# Patient Record
Sex: FEMALE | Race: BLACK OR AFRICAN AMERICAN | Employment: OTHER
[De-identification: names, ages, dates, MRNs, and addresses within clinical notes are randomized per-mention and may not be internally consistent; named-entity substitution may affect disease eponyms.]

---

## 2017-01-25 DIAGNOSIS — F31.9 BIPOLAR 1 DISORDER (HCC): ICD-10-CM

## 2017-01-25 DIAGNOSIS — R73.9 HYPERGLYCEMIA: ICD-10-CM

## 2017-01-25 DIAGNOSIS — Z13.9 SCREENING: ICD-10-CM

## 2017-01-25 DIAGNOSIS — E78.5 HYPERLIPIDEMIA, UNSPECIFIED HYPERLIPIDEMIA TYPE: ICD-10-CM

## 2017-01-25 DIAGNOSIS — F41.9 ANXIETY: ICD-10-CM

## 2017-02-21 ENCOUNTER — OFFICE VISIT (OUTPATIENT)
Dept: FAMILY MEDICINE CLINIC | Facility: CLINIC | Age: 67
End: 2017-02-21

## 2017-02-21 VITALS
WEIGHT: 188 LBS | HEART RATE: 82 BPM | DIASTOLIC BLOOD PRESSURE: 67 MMHG | SYSTOLIC BLOOD PRESSURE: 132 MMHG | HEIGHT: 65 IN | BODY MASS INDEX: 31.32 KG/M2 | OXYGEN SATURATION: 98 % | TEMPERATURE: 98 F

## 2017-02-21 DIAGNOSIS — M85.80 OTHER SPECIFIED DISORDERS OF BONE DENSITY AND STRUCTURE, UNSPECIFIED SITE: ICD-10-CM

## 2017-02-21 DIAGNOSIS — F48.9 NONPSYCHOTIC MENTAL DISORDER: ICD-10-CM

## 2017-02-21 DIAGNOSIS — E78.5 HYPERLIPIDEMIA, UNSPECIFIED HYPERLIPIDEMIA TYPE: Primary | ICD-10-CM

## 2017-02-21 DIAGNOSIS — Z13.9 SCREENING: ICD-10-CM

## 2017-02-21 DIAGNOSIS — F31.9 BIPOLAR 1 DISORDER (HCC): ICD-10-CM

## 2017-02-21 DIAGNOSIS — R73.9 HYPERGLYCEMIA: ICD-10-CM

## 2017-02-21 DIAGNOSIS — K59.00 CONSTIPATION, UNSPECIFIED CONSTIPATION TYPE: ICD-10-CM

## 2017-02-21 DIAGNOSIS — F41.9 ANXIETY: ICD-10-CM

## 2017-02-21 PROCEDURE — 3014F SCREEN MAMMO DOC REV: CPT | Performed by: PHYSICIAN ASSISTANT

## 2017-02-21 PROCEDURE — 3017F COLORECTAL CA SCREEN DOC REV: CPT | Performed by: PHYSICIAN ASSISTANT

## 2017-02-21 PROCEDURE — G8427 DOCREV CUR MEDS BY ELIG CLIN: HCPCS | Performed by: PHYSICIAN ASSISTANT

## 2017-02-21 PROCEDURE — 1123F ACP DISCUSS/DSCN MKR DOCD: CPT | Performed by: PHYSICIAN ASSISTANT

## 2017-02-21 PROCEDURE — G8484 FLU IMMUNIZE NO ADMIN: HCPCS | Performed by: PHYSICIAN ASSISTANT

## 2017-02-21 PROCEDURE — G8417 CALC BMI ABV UP PARAM F/U: HCPCS | Performed by: PHYSICIAN ASSISTANT

## 2017-02-21 PROCEDURE — 4040F PNEUMOC VAC/ADMIN/RCVD: CPT | Performed by: PHYSICIAN ASSISTANT

## 2017-02-21 PROCEDURE — G8399 PT W/DXA RESULTS DOCUMENT: HCPCS | Performed by: PHYSICIAN ASSISTANT

## 2017-02-21 PROCEDURE — 1036F TOBACCO NON-USER: CPT | Performed by: PHYSICIAN ASSISTANT

## 2017-02-21 PROCEDURE — 99214 OFFICE O/P EST MOD 30 MIN: CPT | Performed by: PHYSICIAN ASSISTANT

## 2017-02-21 PROCEDURE — 1090F PRES/ABSN URINE INCON ASSESS: CPT | Performed by: PHYSICIAN ASSISTANT

## 2017-02-21 ASSESSMENT — ENCOUNTER SYMPTOMS
COUGH: 0
SINUS PRESSURE: 0
DIARRHEA: 0
EYE DISCHARGE: 0
EYE ITCHING: 0
CHEST TIGHTNESS: 0
CONSTIPATION: 1
RHINORRHEA: 0
NAUSEA: 0
ORTHOPNEA: 0
ABDOMINAL PAIN: 0
SHORTNESS OF BREATH: 0
BLURRED VISION: 0
VOMITING: 0
SORE THROAT: 0

## 2017-02-21 ASSESSMENT — PATIENT HEALTH QUESTIONNAIRE - PHQ9
2. FEELING DOWN, DEPRESSED OR HOPELESS: 0
SUM OF ALL RESPONSES TO PHQ9 QUESTIONS 1 & 2: 0
1. LITTLE INTEREST OR PLEASURE IN DOING THINGS: 0
SUM OF ALL RESPONSES TO PHQ QUESTIONS 1-9: 0

## 2017-07-25 ENCOUNTER — HOSPITAL ENCOUNTER (EMERGENCY)
Facility: CLINIC | Age: 67
Discharge: HOME | End: 2017-07-25
Attending: EMERGENCY MEDICINE
Payer: MEDICARE

## 2017-07-25 VITALS
HEART RATE: 75 BPM | RESPIRATION RATE: 19 BRPM | OXYGEN SATURATION: 96 % | DIASTOLIC BLOOD PRESSURE: 75 MMHG | SYSTOLIC BLOOD PRESSURE: 158 MMHG | TEMPERATURE: 97.7 F | WEIGHT: 185 LBS | BODY MASS INDEX: 30.82 KG/M2 | HEIGHT: 65 IN

## 2017-07-25 DIAGNOSIS — N39.0 URINARY TRACT INFECTION, SITE UNSPECIFIED: Primary | ICD-10-CM

## 2017-07-25 LAB
-: ABNORMAL
ABSOLUTE EOS #: 0.2 K/UL (ref 0–0.4)
ABSOLUTE LYMPH #: 1.6 K/UL (ref 1–4.8)
ABSOLUTE MONO #: 1.2 K/UL (ref 0.1–1.2)
AMORPHOUS: ABNORMAL
ANION GAP SERPL CALCULATED.3IONS-SCNC: 11 MMOL/L (ref 9–17)
BACTERIA: ABNORMAL
BASOPHILS # BLD: 0 %
BASOPHILS ABSOLUTE: 0 K/UL (ref 0–0.2)
BILIRUBIN URINE: NEGATIVE
BUN BLDV-MCNC: 19 MG/DL (ref 8–23)
BUN/CREAT BLD: ABNORMAL (ref 9–20)
CALCIUM SERPL-MCNC: 9.5 MG/DL (ref 8.6–10.4)
CASTS UA: ABNORMAL /LPF (ref 0–2)
CHLORIDE BLD-SCNC: 106 MMOL/L (ref 98–107)
CO2: 28 MMOL/L (ref 20–31)
COLOR: YELLOW
COMMENT UA: ABNORMAL
CREAT SERPL-MCNC: 0.7 MG/DL (ref 0.5–0.9)
CRYSTALS, UA: ABNORMAL /HPF
CULTURE: NORMAL
DIFFERENTIAL TYPE: NORMAL
EOSINOPHILS RELATIVE PERCENT: 2 %
EPITHELIAL CELLS UA: ABNORMAL /HPF (ref 0–5)
GFR AFRICAN AMERICAN: >60 ML/MIN
GFR NON-AFRICAN AMERICAN: >60 ML/MIN
GFR SERPL CREATININE-BSD FRML MDRD: ABNORMAL ML/MIN/{1.73_M2}
GFR SERPL CREATININE-BSD FRML MDRD: ABNORMAL ML/MIN/{1.73_M2}
GLUCOSE BLD-MCNC: 120 MG/DL (ref 70–99)
GLUCOSE URINE: NEGATIVE
HCT VFR BLD CALC: 39.2 % (ref 36–46)
HEMOGLOBIN: 12.8 G/DL (ref 12–16)
KETONES, URINE: NEGATIVE
LEUKOCYTE ESTERASE, URINE: ABNORMAL
LYMPHOCYTES # BLD: 23 %
Lab: NORMAL
MCH RBC QN AUTO: 30.3 PG (ref 26–34)
MCHC RBC AUTO-ENTMCNC: 32.6 G/DL (ref 31–37)
MCV RBC AUTO: 92.7 FL (ref 80–100)
MONOCYTES # BLD: 17 %
MUCUS: ABNORMAL
NITRITE, URINE: POSITIVE
OTHER OBSERVATIONS UA: ABNORMAL
PDW BLD-RTO: 13 % (ref 12.5–15.4)
PH UA: 5.5 (ref 5–8)
PLATELET # BLD: 381 K/UL (ref 140–450)
PLATELET ESTIMATE: NORMAL
PMV BLD AUTO: 7.5 FL (ref 6–12)
POTASSIUM SERPL-SCNC: 3.8 MMOL/L (ref 3.7–5.3)
PROTEIN UA: ABNORMAL
RBC # BLD: 4.22 M/UL (ref 4–5.2)
RBC # BLD: NORMAL 10*6/UL
RBC UA: ABNORMAL /HPF (ref 0–2)
RENAL EPITHELIAL, UA: ABNORMAL /HPF
SEG NEUTROPHILS: 58 %
SEGMENTED NEUTROPHILS ABSOLUTE COUNT: 4.1 K/UL (ref 1.8–7.7)
SODIUM BLD-SCNC: 145 MMOL/L (ref 135–144)
SPECIFIC GRAVITY UA: 1.01 (ref 1–1.03)
SPECIMEN DESCRIPTION: NORMAL
SPECIMEN DESCRIPTION: NORMAL
STATUS: NORMAL
TRICHOMONAS: ABNORMAL
TURBIDITY: ABNORMAL
URINE HGB: ABNORMAL
UROBILINOGEN, URINE: NORMAL
WBC # BLD: 7.1 K/UL (ref 3.5–11)
WBC # BLD: NORMAL 10*3/UL
WBC UA: ABNORMAL /HPF (ref 0–5)
YEAST: ABNORMAL

## 2017-07-25 PROCEDURE — 99284 EMERGENCY DEPT VISIT MOD MDM: CPT

## 2017-07-25 PROCEDURE — 87186 SC STD MICRODIL/AGAR DIL: CPT

## 2017-07-25 PROCEDURE — 87077 CULTURE AEROBIC IDENTIFY: CPT

## 2017-07-25 PROCEDURE — 87086 URINE CULTURE/COLONY COUNT: CPT

## 2017-07-25 PROCEDURE — 36415 COLL VENOUS BLD VENIPUNCTURE: CPT

## 2017-07-25 PROCEDURE — 80048 BASIC METABOLIC PNL TOTAL CA: CPT

## 2017-07-25 PROCEDURE — 81001 URINALYSIS AUTO W/SCOPE: CPT

## 2017-07-25 PROCEDURE — 85025 COMPLETE CBC W/AUTO DIFF WBC: CPT

## 2017-07-25 RX ORDER — DIPHENHYDRAMINE HCL 25 MG
25 CAPSULE ORAL EVERY 6 HOURS PRN
COMMUNITY

## 2017-07-25 RX ORDER — CEPHALEXIN 500 MG/1
500 CAPSULE ORAL 4 TIMES DAILY
Qty: 28 CAPSULE | Refills: 0 | Status: SHIPPED | OUTPATIENT
Start: 2017-07-25 | End: 2017-07-25

## 2017-07-25 RX ORDER — IBUPROFEN 200 MG
200 TABLET ORAL EVERY 6 HOURS PRN
COMMUNITY

## 2017-07-25 RX ORDER — CEFDINIR 250 MG/5ML
250 POWDER, FOR SUSPENSION ORAL 2 TIMES DAILY
Qty: 70 ML | Refills: 0 | Status: SHIPPED | OUTPATIENT
Start: 2017-07-25 | End: 2017-08-01

## 2017-07-25 RX ORDER — TRAZODONE HYDROCHLORIDE 100 MG/1
100 TABLET ORAL NIGHTLY
COMMUNITY

## 2017-07-25 RX ORDER — NICOTINE 14MG/24HR
PATCH, TRANSDERMAL 24 HOURS TRANSDERMAL
COMMUNITY

## 2017-07-25 RX ORDER — GUAIFENESIN 600 MG/1
600 TABLET, EXTENDED RELEASE ORAL 2 TIMES DAILY PRN
COMMUNITY

## 2017-07-27 LAB
CULTURE: ABNORMAL
CULTURE: ABNORMAL
Lab: ABNORMAL
ORGANISM: ABNORMAL
SPECIMEN DESCRIPTION: ABNORMAL
STATUS: ABNORMAL

## 2017-08-08 ENCOUNTER — OFFICE VISIT (OUTPATIENT)
Dept: FAMILY MEDICINE CLINIC | Age: 67
End: 2017-08-08
Payer: MEDICARE

## 2017-08-08 VITALS
TEMPERATURE: 97.6 F | BODY MASS INDEX: 31.32 KG/M2 | DIASTOLIC BLOOD PRESSURE: 74 MMHG | WEIGHT: 188 LBS | SYSTOLIC BLOOD PRESSURE: 123 MMHG | HEART RATE: 70 BPM | HEIGHT: 65 IN

## 2017-08-08 DIAGNOSIS — R53.83 FATIGUE, UNSPECIFIED TYPE: ICD-10-CM

## 2017-08-08 DIAGNOSIS — N39.0 URINARY TRACT INFECTION WITHOUT HEMATURIA, SITE UNSPECIFIED: Primary | ICD-10-CM

## 2017-08-08 PROCEDURE — G8427 DOCREV CUR MEDS BY ELIG CLIN: HCPCS | Performed by: PHYSICIAN ASSISTANT

## 2017-08-08 PROCEDURE — 3017F COLORECTAL CA SCREEN DOC REV: CPT | Performed by: PHYSICIAN ASSISTANT

## 2017-08-08 PROCEDURE — 1123F ACP DISCUSS/DSCN MKR DOCD: CPT | Performed by: PHYSICIAN ASSISTANT

## 2017-08-08 PROCEDURE — 1036F TOBACCO NON-USER: CPT | Performed by: PHYSICIAN ASSISTANT

## 2017-08-08 PROCEDURE — 3014F SCREEN MAMMO DOC REV: CPT | Performed by: PHYSICIAN ASSISTANT

## 2017-08-08 PROCEDURE — 4040F PNEUMOC VAC/ADMIN/RCVD: CPT | Performed by: PHYSICIAN ASSISTANT

## 2017-08-08 PROCEDURE — 99213 OFFICE O/P EST LOW 20 MIN: CPT | Performed by: PHYSICIAN ASSISTANT

## 2017-08-08 PROCEDURE — G8399 PT W/DXA RESULTS DOCUMENT: HCPCS | Performed by: PHYSICIAN ASSISTANT

## 2017-08-08 PROCEDURE — G8417 CALC BMI ABV UP PARAM F/U: HCPCS | Performed by: PHYSICIAN ASSISTANT

## 2017-08-08 PROCEDURE — 1090F PRES/ABSN URINE INCON ASSESS: CPT | Performed by: PHYSICIAN ASSISTANT

## 2017-08-08 RX ORDER — GUAIFENESIN AND DEXTROMETHORPHAN HYDROBROMIDE 100; 10 MG/5ML; MG/5ML
5 SOLUTION ORAL EVERY 4 HOURS PRN
COMMUNITY

## 2017-08-08 RX ORDER — SODIUM PHOSPHATE, DIBASIC AND SODIUM PHOSPHATE, MONOBASIC 7; 19 G/133ML; G/133ML
1 ENEMA RECTAL
COMMUNITY
End: 2020-07-21

## 2017-08-08 ASSESSMENT — ENCOUNTER SYMPTOMS
COUGH: 0
BLURRED VISION: 0
NAUSEA: 0
ABDOMINAL PAIN: 0
DIARRHEA: 0
EYE DISCHARGE: 0
SINUS PRESSURE: 0
RHINORRHEA: 0
CHEST TIGHTNESS: 0
CONSTIPATION: 1
SHORTNESS OF BREATH: 0
SORE THROAT: 0
VOMITING: 0
EYE ITCHING: 0
ORTHOPNEA: 0

## 2017-08-09 ENCOUNTER — TELEPHONE (OUTPATIENT)
Dept: FAMILY MEDICINE CLINIC | Age: 67
End: 2017-08-09

## 2017-08-09 ENCOUNTER — HOSPITAL ENCOUNTER (OUTPATIENT)
Facility: CLINIC | Age: 67
Discharge: HOME | End: 2017-08-09
Payer: MEDICARE

## 2017-08-09 DIAGNOSIS — K59.00 CONSTIPATION, UNSPECIFIED CONSTIPATION TYPE: ICD-10-CM

## 2017-08-09 DIAGNOSIS — E78.5 HYPERLIPIDEMIA, UNSPECIFIED HYPERLIPIDEMIA TYPE: ICD-10-CM

## 2017-08-09 DIAGNOSIS — Z13.9 SCREENING FOR CONDITION: ICD-10-CM

## 2017-08-09 DIAGNOSIS — M85.80 OTHER SPECIFIED DISORDERS OF BONE DENSITY AND STRUCTURE, UNSPECIFIED SITE: ICD-10-CM

## 2017-08-09 DIAGNOSIS — F48.9 NONPSYCHOTIC MENTAL DISORDER: ICD-10-CM

## 2017-08-09 DIAGNOSIS — F41.9 ANXIETY: ICD-10-CM

## 2017-08-09 DIAGNOSIS — F31.9 BIPOLAR 1 DISORDER (HCC): ICD-10-CM

## 2017-08-09 DIAGNOSIS — R53.83 FATIGUE, UNSPECIFIED TYPE: ICD-10-CM

## 2017-08-09 DIAGNOSIS — N39.0 URINARY TRACT INFECTION WITHOUT HEMATURIA, SITE UNSPECIFIED: ICD-10-CM

## 2017-08-09 DIAGNOSIS — R73.9 HYPERGLYCEMIA: ICD-10-CM

## 2017-08-09 LAB
-: NORMAL
ABSOLUTE EOS #: 0.8 K/UL (ref 0–0.4)
ABSOLUTE LYMPH #: 2.2 K/UL (ref 1–4.8)
ABSOLUTE MONO #: 0.8 K/UL (ref 0.1–1.2)
BASOPHILS # BLD: 0 %
BASOPHILS ABSOLUTE: 0 K/UL (ref 0–0.2)
CHOLESTEROL/HDL RATIO: 5.3
CHOLESTEROL: 147 MG/DL
DIFFERENTIAL TYPE: ABNORMAL
EOSINOPHILS RELATIVE PERCENT: 8 %
ESTIMATED AVERAGE GLUCOSE: 114 MG/DL
HBA1C MFR BLD: 5.6 % (ref 4–6)
HCT VFR BLD CALC: 40.4 % (ref 36–46)
HDLC SERPL-MCNC: 28 MG/DL
HEMOGLOBIN: 13.1 G/DL (ref 12–16)
INSULIN COMMENT: NORMAL
INSULIN REFERENCE RANGE:: NORMAL
INSULIN: 7.7 MU/L
LDL CHOLESTEROL: 90 MG/DL (ref 0–130)
LYMPHOCYTES # BLD: 22 %
MCH RBC QN AUTO: 29.3 PG (ref 26–34)
MCHC RBC AUTO-ENTMCNC: 32.4 G/DL (ref 31–37)
MCV RBC AUTO: 90.4 FL (ref 80–100)
MONOCYTES # BLD: 8 %
PDW BLD-RTO: 14 % (ref 12.5–15.4)
PLATELET # BLD: 435 K/UL (ref 140–450)
PLATELET ESTIMATE: ABNORMAL
PMV BLD AUTO: 7.7 FL (ref 6–12)
RBC # BLD: 4.47 M/UL (ref 4–5.2)
RBC # BLD: ABNORMAL 10*6/UL
REASON FOR REJECTION: NORMAL
SEG NEUTROPHILS: 62 %
SEGMENTED NEUTROPHILS ABSOLUTE COUNT: 6.4 K/UL (ref 1.8–7.7)
TRIGL SERPL-MCNC: 143 MG/DL
TSH SERPL DL<=0.05 MIU/L-ACNC: 3.02 MIU/L (ref 0.3–5)
VALPROIC ACID, FREE: 8.5 UG/ML (ref 4–15)
VITAMIN D 25-HYDROXY: 41 NG/ML (ref 30–100)
VLDLC SERPL CALC-MCNC: ABNORMAL MG/DL (ref 1–30)
WBC # BLD: 10.3 K/UL (ref 3.5–11)
WBC # BLD: ABNORMAL 10*3/UL
ZZ NTE CLEAN UP: ORDERED TEST: NORMAL
ZZ NTE WITH NAME CLEAN UP: SPECIMEN SOURCE: NORMAL

## 2017-08-09 PROCEDURE — 85025 COMPLETE CBC W/AUTO DIFF WBC: CPT

## 2017-08-09 PROCEDURE — 80061 LIPID PANEL: CPT

## 2017-08-09 PROCEDURE — 82306 VITAMIN D 25 HYDROXY: CPT

## 2017-08-09 PROCEDURE — 84443 ASSAY THYROID STIM HORMONE: CPT

## 2017-08-09 PROCEDURE — 83525 ASSAY OF INSULIN: CPT

## 2017-08-09 PROCEDURE — 87040 BLOOD CULTURE FOR BACTERIA: CPT

## 2017-08-09 PROCEDURE — 83036 HEMOGLOBIN GLYCOSYLATED A1C: CPT

## 2017-08-09 PROCEDURE — 80165 DIPROPYLACETIC ACID FREE: CPT

## 2017-08-09 PROCEDURE — 36415 COLL VENOUS BLD VENIPUNCTURE: CPT

## 2017-08-10 ENCOUNTER — HOSPITAL ENCOUNTER (OUTPATIENT)
Facility: CLINIC | Age: 67
Discharge: HOME | End: 2017-08-10
Payer: MEDICARE

## 2017-08-10 LAB
-: ABNORMAL
ALBUMIN SERPL-MCNC: 2.9 G/DL (ref 3.5–5.2)
ALBUMIN/GLOBULIN RATIO: 0.6 (ref 1–2.5)
ALP BLD-CCNC: 68 U/L (ref 35–104)
ALT SERPL-CCNC: 14 U/L (ref 5–33)
AMORPHOUS: ABNORMAL
ANION GAP SERPL CALCULATED.3IONS-SCNC: 16 MMOL/L (ref 9–17)
AST SERPL-CCNC: 44 U/L
BACTERIA: ABNORMAL
BILIRUB SERPL-MCNC: 0.2 MG/DL (ref 0.3–1.2)
BILIRUBIN URINE: NEGATIVE
BUN BLDV-MCNC: 10 MG/DL (ref 8–23)
BUN/CREAT BLD: ABNORMAL (ref 9–20)
CALCIUM SERPL-MCNC: 8.8 MG/DL (ref 8.6–10.4)
CASTS UA: ABNORMAL /LPF (ref 0–8)
CHLORIDE BLD-SCNC: 98 MMOL/L (ref 98–107)
CO2: 22 MMOL/L (ref 20–31)
COLOR: YELLOW
COMMENT UA: ABNORMAL
CREAT SERPL-MCNC: 0.6 MG/DL (ref 0.5–0.9)
CRYSTALS, UA: ABNORMAL /HPF
EPITHELIAL CELLS UA: ABNORMAL /HPF (ref 0–5)
GFR AFRICAN AMERICAN: >60 ML/MIN
GFR NON-AFRICAN AMERICAN: >60 ML/MIN
GFR SERPL CREATININE-BSD FRML MDRD: ABNORMAL ML/MIN/{1.73_M2}
GFR SERPL CREATININE-BSD FRML MDRD: ABNORMAL ML/MIN/{1.73_M2}
GLUCOSE BLD-MCNC: 111 MG/DL (ref 70–99)
GLUCOSE URINE: NEGATIVE
KETONES, URINE: NEGATIVE
LEUKOCYTE ESTERASE, URINE: ABNORMAL
MUCUS: ABNORMAL
NITRITE, URINE: POSITIVE
OTHER OBSERVATIONS UA: ABNORMAL
PH UA: 5.5 (ref 5–8)
POTASSIUM SERPL-SCNC: 5.1 MMOL/L (ref 3.7–5.3)
PROTEIN UA: ABNORMAL
RBC UA: ABNORMAL /HPF (ref 0–4)
RENAL EPITHELIAL, UA: ABNORMAL /HPF
SODIUM BLD-SCNC: 136 MMOL/L (ref 135–144)
SPECIFIC GRAVITY UA: 1.02 (ref 1–1.03)
TOTAL PROTEIN: 7.4 G/DL (ref 6.4–8.3)
TRICHOMONAS: ABNORMAL
TURBIDITY: ABNORMAL
URINE HGB: ABNORMAL
UROBILINOGEN, URINE: NORMAL
WBC UA: ABNORMAL /HPF (ref 0–5)
YEAST: ABNORMAL

## 2017-08-10 PROCEDURE — 87088 URINE BACTERIA CULTURE: CPT

## 2017-08-10 PROCEDURE — 80053 COMPREHEN METABOLIC PANEL: CPT

## 2017-08-10 PROCEDURE — 87086 URINE CULTURE/COLONY COUNT: CPT

## 2017-08-10 PROCEDURE — 81001 URINALYSIS AUTO W/SCOPE: CPT

## 2017-08-10 PROCEDURE — 87186 SC STD MICRODIL/AGAR DIL: CPT

## 2017-08-11 RX ORDER — SULFAMETHOXAZOLE AND TRIMETHOPRIM 800; 160 MG/1; MG/1
1 TABLET ORAL 2 TIMES DAILY
Qty: 20 TABLET | Refills: 0 | Status: SHIPPED | OUTPATIENT
Start: 2017-08-11 | End: 2017-08-21

## 2017-08-15 ENCOUNTER — HOSPITAL ENCOUNTER (EMERGENCY)
Facility: CLINIC | Age: 67
Discharge: HOME | End: 2017-08-15
Attending: EMERGENCY MEDICINE
Payer: MEDICARE

## 2017-08-15 ENCOUNTER — TELEPHONE (OUTPATIENT)
Dept: FAMILY MEDICINE CLINIC | Age: 67
End: 2017-08-15

## 2017-08-15 VITALS
OXYGEN SATURATION: 95 % | HEART RATE: 74 BPM | SYSTOLIC BLOOD PRESSURE: 149 MMHG | WEIGHT: 185 LBS | HEIGHT: 65 IN | DIASTOLIC BLOOD PRESSURE: 133 MMHG | RESPIRATION RATE: 14 BRPM | TEMPERATURE: 97.5 F | BODY MASS INDEX: 30.82 KG/M2

## 2017-08-15 DIAGNOSIS — L27.0 DERMATITIS DUE TO DRUG: Primary | ICD-10-CM

## 2017-08-15 LAB
CULTURE: NORMAL
CULTURE: NORMAL
Lab: NORMAL
SPECIMEN DESCRIPTION: NORMAL
SPECIMEN DESCRIPTION: NORMAL
STATUS: NORMAL

## 2017-08-15 PROCEDURE — 99282 EMERGENCY DEPT VISIT SF MDM: CPT

## 2017-08-15 ASSESSMENT — ENCOUNTER SYMPTOMS
SHORTNESS OF BREATH: 0
CONSTIPATION: 0
VOMITING: 0
COUGH: 0
DIARRHEA: 0
EYE PAIN: 0
BACK PAIN: 0
NAUSEA: 0
BLOOD IN STOOL: 0
ABDOMINAL PAIN: 0

## 2017-08-16 ENCOUNTER — TELEPHONE (OUTPATIENT)
Dept: FAMILY MEDICINE CLINIC | Age: 67
End: 2017-08-16

## 2017-08-16 RX ORDER — NITROFURANTOIN 25; 75 MG/1; MG/1
100 CAPSULE ORAL 2 TIMES DAILY
Qty: 14 CAPSULE | Refills: 0 | Status: SHIPPED | OUTPATIENT
Start: 2017-08-16 | End: 2017-08-23

## 2017-08-16 NOTE — TELEPHONE ENCOUNTER
Pt was seen in the [facility] ER. Pt was diagnosed with dermatitis due a medication pt was put on. But ER Dr carrie Hall at [facility] to continue the Bactrim. Please advise. Thank you.

## 2017-08-22 ENCOUNTER — OFFICE VISIT (OUTPATIENT)
Dept: FAMILY MEDICINE CLINIC | Age: 67
End: 2017-08-22
Payer: MEDICARE

## 2017-08-22 VITALS
OXYGEN SATURATION: 97 % | WEIGHT: 188 LBS | BODY MASS INDEX: 31.32 KG/M2 | HEIGHT: 65 IN | DIASTOLIC BLOOD PRESSURE: 69 MMHG | HEART RATE: 70 BPM | SYSTOLIC BLOOD PRESSURE: 110 MMHG

## 2017-08-22 DIAGNOSIS — K59.00 CONSTIPATION, UNSPECIFIED CONSTIPATION TYPE: ICD-10-CM

## 2017-08-22 DIAGNOSIS — F31.9 BIPOLAR 1 DISORDER (HCC): ICD-10-CM

## 2017-08-22 DIAGNOSIS — N39.0 URINARY TRACT INFECTION WITHOUT HEMATURIA, SITE UNSPECIFIED: Primary | ICD-10-CM

## 2017-08-22 DIAGNOSIS — R73.9 HYPERGLYCEMIA: ICD-10-CM

## 2017-08-22 DIAGNOSIS — F41.9 ANXIETY: ICD-10-CM

## 2017-08-22 DIAGNOSIS — Z12.39 SCREENING FOR BREAST CANCER: ICD-10-CM

## 2017-08-22 DIAGNOSIS — M89.9 DISORDER OF BONE: ICD-10-CM

## 2017-08-22 DIAGNOSIS — F48.9 NONPSYCHOTIC MENTAL DISORDER: ICD-10-CM

## 2017-08-22 DIAGNOSIS — E78.5 HYPERLIPIDEMIA, UNSPECIFIED HYPERLIPIDEMIA TYPE: ICD-10-CM

## 2017-08-22 DIAGNOSIS — Z13.820 SCREENING FOR OSTEOPOROSIS: ICD-10-CM

## 2017-08-22 DIAGNOSIS — Z12.31 ENCOUNTER FOR SCREENING MAMMOGRAM FOR MALIGNANT NEOPLASM OF BREAST: ICD-10-CM

## 2017-08-22 PROCEDURE — G8399 PT W/DXA RESULTS DOCUMENT: HCPCS | Performed by: PHYSICIAN ASSISTANT

## 2017-08-22 PROCEDURE — 3017F COLORECTAL CA SCREEN DOC REV: CPT | Performed by: PHYSICIAN ASSISTANT

## 2017-08-22 PROCEDURE — G8417 CALC BMI ABV UP PARAM F/U: HCPCS | Performed by: PHYSICIAN ASSISTANT

## 2017-08-22 PROCEDURE — 4040F PNEUMOC VAC/ADMIN/RCVD: CPT | Performed by: PHYSICIAN ASSISTANT

## 2017-08-22 PROCEDURE — 1123F ACP DISCUSS/DSCN MKR DOCD: CPT | Performed by: PHYSICIAN ASSISTANT

## 2017-08-22 PROCEDURE — 99214 OFFICE O/P EST MOD 30 MIN: CPT | Performed by: PHYSICIAN ASSISTANT

## 2017-08-22 PROCEDURE — 1036F TOBACCO NON-USER: CPT | Performed by: PHYSICIAN ASSISTANT

## 2017-08-22 PROCEDURE — G8427 DOCREV CUR MEDS BY ELIG CLIN: HCPCS | Performed by: PHYSICIAN ASSISTANT

## 2017-08-22 PROCEDURE — 1090F PRES/ABSN URINE INCON ASSESS: CPT | Performed by: PHYSICIAN ASSISTANT

## 2017-08-22 PROCEDURE — 3014F SCREEN MAMMO DOC REV: CPT | Performed by: PHYSICIAN ASSISTANT

## 2017-08-22 ASSESSMENT — ENCOUNTER SYMPTOMS
BACK PAIN: 0
CHEST TIGHTNESS: 0
NAUSEA: 0
ORTHOPNEA: 0
EYE ITCHING: 0
EYE DISCHARGE: 0
CONSTIPATION: 1
DIARRHEA: 0
SORE THROAT: 0
SINUS PRESSURE: 0
VOMITING: 0
BLURRED VISION: 0
ABDOMINAL PAIN: 0
COUGH: 0
SHORTNESS OF BREATH: 0
RHINORRHEA: 0

## 2017-08-23 ENCOUNTER — HOSPITAL ENCOUNTER (OUTPATIENT)
Age: 67
Setting detail: SPECIMEN
Discharge: HOME | End: 2017-08-23
Payer: MEDICARE

## 2017-08-23 DIAGNOSIS — N39.0 URINARY TRACT INFECTION WITHOUT HEMATURIA, SITE UNSPECIFIED: ICD-10-CM

## 2017-08-23 LAB
-: NORMAL
AMORPHOUS: NORMAL
BACTERIA: NORMAL
BILIRUBIN URINE: NEGATIVE
CASTS UA: NORMAL /LPF (ref 0–8)
COLOR: ABNORMAL
COMMENT UA: ABNORMAL
CRYSTALS, UA: NORMAL /HPF
EPITHELIAL CELLS UA: NORMAL /HPF (ref 0–5)
GLUCOSE URINE: NEGATIVE
KETONES, URINE: ABNORMAL
LEUKOCYTE ESTERASE, URINE: ABNORMAL
MUCUS: NORMAL
NITRITE, URINE: NEGATIVE
OTHER OBSERVATIONS UA: NORMAL
PH UA: 7 (ref 5–8)
PROTEIN UA: NEGATIVE
RBC UA: NORMAL /HPF (ref 0–4)
RENAL EPITHELIAL, UA: NORMAL /HPF
SPECIFIC GRAVITY UA: 1.01 (ref 1–1.03)
TRICHOMONAS: NORMAL
TURBIDITY: CLEAR
URINE HGB: NEGATIVE
UROBILINOGEN, URINE: NORMAL
WBC UA: NORMAL /HPF (ref 0–5)
YEAST: NORMAL

## 2017-08-25 RX ORDER — CIPROFLOXACIN 500 MG/1
500 TABLET, FILM COATED ORAL 2 TIMES DAILY
Qty: 20 TABLET | Refills: 0 | Status: SHIPPED | OUTPATIENT
Start: 2017-08-25 | End: 2017-11-27 | Stop reason: ALTCHOICE

## 2017-10-03 ENCOUNTER — OFFICE VISIT (OUTPATIENT)
Dept: FAMILY MEDICINE CLINIC | Age: 67
End: 2017-10-03
Payer: MEDICARE

## 2017-10-03 VITALS
DIASTOLIC BLOOD PRESSURE: 66 MMHG | RESPIRATION RATE: 18 BRPM | BODY MASS INDEX: 28.82 KG/M2 | HEIGHT: 65 IN | HEART RATE: 67 BPM | SYSTOLIC BLOOD PRESSURE: 116 MMHG | WEIGHT: 173 LBS

## 2017-10-03 DIAGNOSIS — Z76.89 ENCOUNTER TO ESTABLISH CARE: Primary | ICD-10-CM

## 2017-10-03 DIAGNOSIS — Z12.11 SCREEN FOR COLON CANCER: ICD-10-CM

## 2017-10-03 DIAGNOSIS — K59.00 CONSTIPATION, UNSPECIFIED CONSTIPATION TYPE: ICD-10-CM

## 2017-10-03 DIAGNOSIS — F48.9 NONPSYCHOTIC MENTAL DISORDER: ICD-10-CM

## 2017-10-03 DIAGNOSIS — F41.9 ANXIETY: ICD-10-CM

## 2017-10-03 DIAGNOSIS — E78.5 HYPERLIPIDEMIA, UNSPECIFIED HYPERLIPIDEMIA TYPE: ICD-10-CM

## 2017-10-03 DIAGNOSIS — R31.9 HEMATURIA: ICD-10-CM

## 2017-10-03 DIAGNOSIS — F31.9 BIPOLAR 1 DISORDER (HCC): ICD-10-CM

## 2017-10-03 PROBLEM — Z13.220 LIPID SCREENING: Status: ACTIVE | Noted: 2017-10-03

## 2017-10-03 PROBLEM — Z13.220 LIPID SCREENING: Status: RESOLVED | Noted: 2017-10-03 | Resolved: 2017-10-03

## 2017-10-03 PROCEDURE — G8417 CALC BMI ABV UP PARAM F/U: HCPCS | Performed by: FAMILY MEDICINE

## 2017-10-03 PROCEDURE — G8427 DOCREV CUR MEDS BY ELIG CLIN: HCPCS | Performed by: FAMILY MEDICINE

## 2017-10-03 PROCEDURE — 3014F SCREEN MAMMO DOC REV: CPT | Performed by: FAMILY MEDICINE

## 2017-10-03 PROCEDURE — G8399 PT W/DXA RESULTS DOCUMENT: HCPCS | Performed by: FAMILY MEDICINE

## 2017-10-03 PROCEDURE — G8484 FLU IMMUNIZE NO ADMIN: HCPCS | Performed by: FAMILY MEDICINE

## 2017-10-03 PROCEDURE — 99214 OFFICE O/P EST MOD 30 MIN: CPT | Performed by: FAMILY MEDICINE

## 2017-10-03 PROCEDURE — 1090F PRES/ABSN URINE INCON ASSESS: CPT | Performed by: FAMILY MEDICINE

## 2017-10-03 PROCEDURE — 1123F ACP DISCUSS/DSCN MKR DOCD: CPT | Performed by: FAMILY MEDICINE

## 2017-10-03 PROCEDURE — 4040F PNEUMOC VAC/ADMIN/RCVD: CPT | Performed by: FAMILY MEDICINE

## 2017-10-03 PROCEDURE — 3017F COLORECTAL CA SCREEN DOC REV: CPT | Performed by: FAMILY MEDICINE

## 2017-10-03 PROCEDURE — 1036F TOBACCO NON-USER: CPT | Performed by: FAMILY MEDICINE

## 2017-10-03 ASSESSMENT — ENCOUNTER SYMPTOMS
CONSTIPATION: 0
VOMITING: 0
DIARRHEA: 0
EYE PAIN: 0
SHORTNESS OF BREATH: 0
NAUSEA: 0
BLOOD IN STOOL: 0

## 2017-10-03 NOTE — PROGRESS NOTES
Antwan Stoddard MD  [facility]YSICI[facility]  COMPREHENSIVE CARE  [address]  [place] [address] [place] [address]  Dept: [phone]    Eunice Stewart is a 69 y.o. female who presents today for her medical conditions/complaints as noted below. Eunice Stewart is here today c/o Establish Care (est care)       HPI:     HPI    Here to establish care, previous pt of Jill Dandy, resident at [facility] [address] group home  Seen today with caregiver Jeanette Ojeda is her cousin Elias Luis Fernando, he usually isn't at [facility]  Valproic acid level was normal at 8.5 in Aug 2017  HDL low at 28, minimal walking at the group home, she is not motivated to exercise and move around, pain does not seem to be an issue according to the caregivers  Had UTI in Aug, urinalysis showed blood, needs repeat urinalysis to ensure resolution of the hematuria  Bipolar,DD,PTSD - Seeing Dr. Royal Marti, on depakene and trihexphenidyl  No aggression, no self-injurious behavioral, often will be inward in her activities  HTN - On propranolol 60 mg qd, bp well controlled at home  Hx of ulcer and had G tube and this was removed many yars ago and now gets all of her meds crushed. On famotidine 20 mg bid. Constipation - On lactulose, senna, prune juice daily, BMs are soft and regular, no blood or melena      Patient Active Problem List   Diagnosis    Hyperlipidemia    Constipation    Anxiety    Screening for condition    Bipolar 1 disorder ([address]    Nonpsychotic mental disorder     Hyperglycemia       Past Medical History:   Diagnosis Date    Allergic rhinitis     Bipolar affective ([address]     Constipation     Contracture of muscle of multiple sites     bilateral upper extremities.     Dermatomyositis ([address]     Dysphagia     Hypercholesterolemia     Hypertension     Moderate intellectual disabilities     Neuroleptic-induced tardive dyskinesia     PTSD (post-traumatic stress disorder)     Pyloric ulcer     w stenosis    Schizophrenia ([address]      No past surgical history on file. No family history on file. Social History   Substance Use Topics    Smoking status: Never Smoker    Smokeless tobacco: Never Used    Alcohol use No       Current Outpatient Prescriptions:     ciprofloxacin (CIPRO) 500 MG tablet, Take 1 tablet by mouth 2 times daily, Disp: 20 tablet, Rfl: 0    Sodium Phosphates (FLEET) 7-19 GM/118ML, Place 1 enema rectally once as needed, Disp: , Rfl:     hydrocortisone (ANUSOL-HC) 2.5 % rectal cream, Place rectally 2 times daily Place rectally 2 times daily. , Disp: , Rfl:     Dextromethorphan-guaiFENesin  MG/5ML SYRP, Take 5 mLs by mouth every 4 hours as needed for Cough, Disp: , Rfl:     traZODone (DESYREL) 100 MG tablet, Take 100 mg by mouth nightly, Disp: , Rfl:     diphenhydrAMINE (BENADRYL) 25 MG capsule, Take 25 mg by mouth every 6 hours as needed for Itching or Allergies, Disp: , Rfl:     ibuprofen (ADVIL;MOTRIN) 200 MG tablet, Take 200 mg by mouth every 6 hours as needed for Pain, Disp: , Rfl:     magnesium hydroxide (MILK OF MAGNESIA) 400 MG/5ML suspension, Take 30 mLs by mouth daily as needed for Constipation, Disp: , Rfl:     guaiFENesin (MUCINEX) 600 MG extended release tablet, Take 600 mg by mouth 2 times daily as needed for Congestion, Disp: , Rfl:     Saccharomyces boulardii (PROBIOTIC) 250 MG CAPS, Take by mouth, Disp: , Rfl:     aspirin 81 MG chewable tablet, Take 81 mg by mouth daily, Disp: , Rfl:     famotidine (PEPCID) 20 MG tablet, Take 20 mg by mouth 2 times daily, Disp: , Rfl:     fenofibrate 160 MG tablet, Take 160 mg by mouth daily, Disp: , Rfl:     ipratropium (ATROVENT) 0.03 % nasal spray, 2 sprays by Nasal route 2 times daily, Disp: , Rfl:     lactulose (CHRONULAC) 10 GM/15ML solution, Take 20 g by mouth nightly, Disp: , Rfl:     Multiple Vitamins-Minerals (MULTIVITAMIN PO), Take by mouth daily, Disp: , Rfl:     polyethylene glycol (GLYCOLAX) packet, Take 17 g by mouth daily as needed, Disp: , Rfl:    propranolol (INDERAL) 60 MG tablet, Take 60 mg by mouth 2 times daily 2 BID, Disp: , Rfl:     QUEtiapine (SEROQUEL) 300 MG tablet, Take 300 mg by mouth Daily, Disp: , Rfl:     QUEtiapine (SEROQUEL) 400 MG tablet, Take 400 mg by mouth daily, Disp: , Rfl:     senna (SENOKOT) 8.6 MG TABS tablet, Take 1 tablet by mouth 2 times daily, Disp: , Rfl:     simvastatin (ZOCOR) 40 MG tablet, Take 40 mg by mouth nightly, Disp: , Rfl:     trihexyphenidyl (ARTANE) 2 MG tablet, Take 2 mg by mouth nightly, Disp: , Rfl:     valproic acid (DEPAKENE) 250 MG/5ML SYRP, Take 250 mg by mouth 1250 MG PO, Disp: , Rfl:     acetaminophen (TYLENOL) 160 MG/5ML liquid, Take 15 mg/kg by mouth every 4 hours as needed for Fever 20 ML Q 4 HRS, Disp: , Rfl:     acetaminophen (TYLENOL) 325 MG tablet, Take 650 mg by mouth every 4 hours as needed for Pain 2 Q 4 HRS, Disp: , Rfl:     bisacodyl (DULCOLAX) 5 MG EC tablet, Take 5 mg by mouth as needed for Constipation 1 EVERY 2 DAYS AS NEEDED, Disp: , Rfl:     shark liver oil-cocoa butter (HEMORRHOID) 0.25-3-85.5 % suppository, Place 1 suppository rectally as needed for Hemorrhoids, Disp: , Rfl:     Tetrahydrozoline HCl (EYE DROPS OP), Apply to eye, Disp: , Rfl:     BACITRACIN EX, Apply topically, Disp: , Rfl:     LORazepam (ATIVAN) 2 MG tablet, Take 1 mg by mouth as needed for Anxiety 1 HR BEFORE DENTAL PROCEDURES , Disp: , Rfl:     Subjective:     Review of Systems   Constitutional: Negative for appetite change, diaphoresis, fever and unexpected weight change. HENT: Negative for ear pain. Eyes: Negative for pain. Respiratory: Negative for shortness of breath. Cardiovascular: Negative for chest pain and leg swelling. Gastrointestinal: Negative for blood in stool, constipation, diarrhea, nausea and vomiting. Psychiatric/Behavioral: Negative for agitation, dysphoric mood and suicidal ideas.        Objective:     /66  Pulse 67  Resp 18  Ht 5' 4.96\" (1.65 m)  Wt 173 lb (78.5 kg)  Breastfeeding? No  BMI 28.82 kg/m2    Physical Exam   Constitutional: She is oriented to person, place, and time. She appears well-developed and well-nourished. HENT:   Head: Normocephalic. Right Ear: External ear normal.   Left Ear: External ear normal.   Eyes: Conjunctivae and EOM are normal.   Neck: Normal range of motion. Cardiovascular: Normal rate, regular rhythm and normal heart sounds. No murmur heard. Pulmonary/Chest: Effort normal and breath sounds normal. No respiratory distress. She has no wheezes. Abdominal: Soft. Bowel sounds are normal. She exhibits no distension. There is no rebound. Lymphadenopathy:     She has no cervical adenopathy. Neurological: She is alert and oriented to person, place, and time. Psychiatric: She has a normal mood and affect. Her behavior is normal. Judgment and thought content normal.       Assessment & Plan:      1. Encounter to establish care  - CBC Auto Differential; Future  - Comprehensive Metabolic Panel; Future  - Lipid Panel; Future  - TSH with Reflex; Future  - Vitamin B12; Future    2. Hematuria  Had UTI in Aug, needs repeat urinalysis to ensure hematuria has resolved  - Urine Culture  - Urinalysis    3. Bipolar 1 disorder/DD/PTSD  Seeing Dr. Arline Cardona, on depakene and trihexphenidyl  - CBC Auto Differential; Future  - Comprehensive Metabolic Panel; Future  - Lipid Panel; Future  - TSH with Reflex; Future  - Vitamin B12; Future    6. Constipation, unspecified constipation type  Continue with lactulose, senna, and prune juice    7.  Screen for colon cancer  - Nyasia Alvarado MD, [facility]    Has mammogram and DEXA scheduled for end of Oct    Due for pap - caregiver thinks the guardian opposed but caregiver will recheck    Will get flu shot at group home  Up to date with tetanus    F/U in 1 year with labs    Electronically signed by Francisco J Anthony MD on 10/3/2017 at 1:57 PM

## 2017-10-03 NOTE — MR AVS SNAPSHOT
Blood Pressure Pulse Respirations Height Weight Breastfeeding? 116/66 67 18 5' 4.96\" (1.65 m) 173 lb (78.5 kg) No    Body Mass Index Smoking Status                28.82 kg/m2 Never Smoker          Additional Information about your Body Mass Index (BMI)           Your BMI as listed above is considered overweight (25.0-29.9). BMI is an estimate of body fat, calculated from your height and weight. The higher your BMI, the greater your risk of heart disease, high blood pressure, type 2 diabetes, stroke, gallstones, arthritis, sleep apnea, and certain cancers. BMI is not perfect. It may overestimate body fat in athletes and people who are more muscular. If your body fat is high you can improve your BMI by decreasing your calorie intake and becoming more physically active. Learn more at: [employer]             Medications and Orders      Your Current Medications Are              ciprofloxacin (CIPRO) 500 MG tablet Take 1 tablet by mouth 2 times daily    Sodium Phosphates (FLEET) 7-19 GM/118ML Place 1 enema rectally once as needed    hydrocortisone (ANUSOL-HC) 2.5 % rectal cream Place rectally 2 times daily Place rectally 2 times daily.     Dextromethorphan-guaiFENesin  MG/5ML SYRP Take 5 mLs by mouth every 4 hours as needed for Cough    traZODone (DESYREL) 100 MG tablet Take 100 mg by mouth nightly    diphenhydrAMINE (BENADRYL) 25 MG capsule Take 25 mg by mouth every 6 hours as needed for Itching or Allergies    ibuprofen (ADVIL;MOTRIN) 200 MG tablet Take 200 mg by mouth every 6 hours as needed for Pain    magnesium hydroxide (MILK OF MAGNESIA) 400 MG/5ML suspension Take 30 mLs by mouth daily as needed for Constipation    guaiFENesin (MUCINEX) 600 MG extended release tablet Take 600 mg by mouth 2 times daily as needed for Congestion    Saccharomyces boulardii (PROBIOTIC) 250 MG CAPS Take by mouth    aspirin 81 MG chewable tablet Take 81 mg by mouth daily

## 2017-10-03 NOTE — PROGRESS NOTES
Have you seen any other physician or provider since your last visit no    Have you had any other diagnostic tests since your last visit? no    Have you changed or stopped any medications since your last visit including any over-the-counter medicines, vitamins, or herbal medicines? no     Are you taking all your prescribed medications? yes  If NO, why? -  N/A           Patient Self-Management Goal for this visit.    What is your goal for your visit today? - est care   Barriers to success: none   Plan for overcoming my barriers: N/A      Confidence: 10/10   Date goal set: 10/3/17   Date expected to reach goal: 2days

## 2017-10-04 ENCOUNTER — HOSPITAL ENCOUNTER (OUTPATIENT)
Facility: CLINIC | Age: 67
Setting detail: SPECIMEN
Discharge: HOME | End: 2017-10-04
Payer: MEDICARE

## 2017-10-04 LAB
-: NORMAL
AMORPHOUS: NORMAL
BACTERIA: NORMAL
BILIRUBIN URINE: ABNORMAL
CASTS UA: NORMAL /LPF (ref 0–8)
COLOR: ABNORMAL
COMMENT UA: ABNORMAL
CRYSTALS, UA: NORMAL /HPF
EPITHELIAL CELLS UA: NORMAL /HPF (ref 0–5)
GLUCOSE URINE: NEGATIVE
KETONES, URINE: ABNORMAL
LEUKOCYTE ESTERASE, URINE: ABNORMAL
MUCUS: NORMAL
NITRITE, URINE: NEGATIVE
OTHER OBSERVATIONS UA: NORMAL
PH UA: 5 (ref 5–8)
PROTEIN UA: NEGATIVE
RBC UA: NORMAL /HPF (ref 0–4)
RENAL EPITHELIAL, UA: NORMAL /HPF
SPECIFIC GRAVITY UA: 1.02 (ref 1–1.03)
TRICHOMONAS: NORMAL
TURBIDITY: ABNORMAL
URINE HGB: NEGATIVE
UROBILINOGEN, URINE: NORMAL
WBC UA: NORMAL /HPF (ref 0–5)
YEAST: NORMAL

## 2017-10-04 PROCEDURE — 87086 URINE CULTURE/COLONY COUNT: CPT

## 2017-10-04 PROCEDURE — 87088 URINE BACTERIA CULTURE: CPT

## 2017-10-04 PROCEDURE — 87186 SC STD MICRODIL/AGAR DIL: CPT

## 2017-10-04 PROCEDURE — 81001 URINALYSIS AUTO W/SCOPE: CPT

## 2017-10-05 DIAGNOSIS — R31.9 HEMATURIA: Primary | ICD-10-CM

## 2017-10-06 ENCOUNTER — TELEPHONE (OUTPATIENT)
Dept: FAMILY MEDICINE CLINIC | Age: 67
End: 2017-10-06

## 2017-10-06 DIAGNOSIS — N30.01 ACUTE CYSTITIS WITH HEMATURIA: Primary | ICD-10-CM

## 2017-10-06 LAB
CULTURE: ABNORMAL
CULTURE: ABNORMAL
Lab: ABNORMAL
ORGANISM: ABNORMAL
SPECIMEN DESCRIPTION: ABNORMAL
SPECIMEN DESCRIPTION: ABNORMAL
STATUS: ABNORMAL

## 2017-10-06 RX ORDER — NITROFURANTOIN 25; 75 MG/1; MG/1
100 CAPSULE ORAL 2 TIMES DAILY
Qty: 14 CAPSULE | Refills: 0 | Status: SHIPPED | OUTPATIENT
Start: 2017-10-06 | End: 2017-10-13

## 2017-10-21 ENCOUNTER — HOSPITAL ENCOUNTER (OUTPATIENT)
Facility: CLINIC | Age: 67
Setting detail: SPECIMEN
Discharge: HOME | End: 2017-10-21
Payer: MEDICARE

## 2017-10-21 DIAGNOSIS — N30.01 ACUTE CYSTITIS WITH HEMATURIA: ICD-10-CM

## 2017-10-21 LAB
-: NORMAL
AMORPHOUS: NORMAL
BACTERIA: NORMAL
BILIRUBIN URINE: NEGATIVE
CASTS UA: NORMAL /LPF (ref 0–8)
COLOR: YELLOW
COMMENT UA: ABNORMAL
CRYSTALS, UA: NORMAL /HPF
EPITHELIAL CELLS UA: NORMAL /HPF (ref 0–5)
GLUCOSE URINE: NEGATIVE
KETONES, URINE: NEGATIVE
LEUKOCYTE ESTERASE, URINE: ABNORMAL
MUCUS: NORMAL
NITRITE, URINE: NEGATIVE
OTHER OBSERVATIONS UA: NORMAL
PH UA: 7.5 (ref 5–8)
PROTEIN UA: NEGATIVE
RBC UA: NORMAL /HPF (ref 0–4)
RENAL EPITHELIAL, UA: NORMAL /HPF
SPECIFIC GRAVITY UA: 1.01 (ref 1–1.03)
TRICHOMONAS: NORMAL
TURBIDITY: CLEAR
URINE HGB: NEGATIVE
UROBILINOGEN, URINE: NORMAL
WBC UA: NORMAL /HPF (ref 0–5)
YEAST: NORMAL

## 2017-10-21 PROCEDURE — 81001 URINALYSIS AUTO W/SCOPE: CPT

## 2017-10-21 PROCEDURE — 87086 URINE CULTURE/COLONY COUNT: CPT

## 2017-10-23 DIAGNOSIS — R31.21 ASYMPTOMATIC MICROSCOPIC HEMATURIA: Primary | ICD-10-CM

## 2017-10-24 DIAGNOSIS — Z12.11 COLON CANCER SCREENING: Primary | ICD-10-CM

## 2017-10-30 ENCOUNTER — HOSPITAL ENCOUNTER (OUTPATIENT)
Dept: MAMMOGRAPHY | Age: 67
Discharge: HOME | End: 2017-10-30
Payer: MEDICARE

## 2017-10-30 ENCOUNTER — HOSPITAL ENCOUNTER (OUTPATIENT)
Dept: ULTRASOUND IMAGING | Age: 67
Discharge: HOME | End: 2017-10-30
Payer: MEDICARE

## 2017-10-30 DIAGNOSIS — Z12.39 SCREENING FOR BREAST CANCER: ICD-10-CM

## 2017-10-30 DIAGNOSIS — Z13.820 SCREENING FOR OSTEOPOROSIS: ICD-10-CM

## 2017-10-30 DIAGNOSIS — Z12.31 ENCOUNTER FOR SCREENING MAMMOGRAM FOR MALIGNANT NEOPLASM OF BREAST: ICD-10-CM

## 2017-10-30 DIAGNOSIS — R31.9 HEMATURIA: ICD-10-CM

## 2017-12-11 ENCOUNTER — TELEPHONE (OUTPATIENT)
Dept: GASTROENTEROLOGY | Age: 67
End: 2017-12-11

## 2017-12-11 NOTE — TELEPHONE ENCOUNTER
Debra from [address] [place] [address] calls regarding upcoming colonoscopy. She states that patient takes valproic acid at bedtime which is red liquid. D/W Dr Dc Hoffman - OK to continue taking prior to colonoscopy. Writer calls Debra back to advise.

## 2017-12-13 ENCOUNTER — HOSPITAL ENCOUNTER (OUTPATIENT)
Age: 67
Setting detail: OUTPATIENT SURGERY
Discharge: HOME | End: 2017-12-13
Attending: INTERNAL MEDICINE | Admitting: INTERNAL MEDICINE
Payer: MEDICARE

## 2017-12-13 ENCOUNTER — ANESTHESIA (OUTPATIENT)
Dept: OPERATING ROOM | Age: 67
End: 2017-12-13
Payer: MEDICARE

## 2017-12-13 ENCOUNTER — ANESTHESIA EVENT (OUTPATIENT)
Dept: OPERATING ROOM | Age: 67
End: 2017-12-13
Payer: MEDICARE

## 2017-12-13 VITALS — OXYGEN SATURATION: 100 % | SYSTOLIC BLOOD PRESSURE: 148 MMHG | DIASTOLIC BLOOD PRESSURE: 67 MMHG

## 2017-12-13 VITALS
WEIGHT: 187 LBS | SYSTOLIC BLOOD PRESSURE: 174 MMHG | BODY MASS INDEX: 30.05 KG/M2 | HEART RATE: 52 BPM | TEMPERATURE: 97.5 F | OXYGEN SATURATION: 99 % | HEIGHT: 66 IN | DIASTOLIC BLOOD PRESSURE: 81 MMHG | RESPIRATION RATE: 13 BRPM

## 2017-12-13 PROCEDURE — 3700000001 HC ADD 15 MINUTES (ANESTHESIA): Performed by: INTERNAL MEDICINE

## 2017-12-13 PROCEDURE — 7100000010 HC PHASE II RECOVERY - FIRST 15 MIN: Performed by: INTERNAL MEDICINE

## 2017-12-13 PROCEDURE — 3700000000 HC ANESTHESIA ATTENDED CARE: Performed by: INTERNAL MEDICINE

## 2017-12-13 PROCEDURE — 2500000003 HC RX 250 WO HCPCS: Performed by: NURSE ANESTHETIST, CERTIFIED REGISTERED

## 2017-12-13 PROCEDURE — 45330 DIAGNOSTIC SIGMOIDOSCOPY: CPT | Performed by: INTERNAL MEDICINE

## 2017-12-13 PROCEDURE — 2580000003 HC RX 258: Performed by: ANESTHESIOLOGY

## 2017-12-13 PROCEDURE — 3609027000 HC COLONOSCOPY: Performed by: INTERNAL MEDICINE

## 2017-12-13 PROCEDURE — 6360000002 HC RX W HCPCS: Performed by: NURSE ANESTHETIST, CERTIFIED REGISTERED

## 2017-12-13 PROCEDURE — 7100000011 HC PHASE II RECOVERY - ADDTL 15 MIN: Performed by: INTERNAL MEDICINE

## 2017-12-13 RX ORDER — LIDOCAINE HYDROCHLORIDE 20 MG/ML
INJECTION, SOLUTION INFILTRATION; PERINEURAL PRN
Status: DISCONTINUED | OUTPATIENT
Start: 2017-12-13 | End: 2017-12-13 | Stop reason: SDUPTHER

## 2017-12-13 RX ORDER — SODIUM CHLORIDE, SODIUM LACTATE, POTASSIUM CHLORIDE, CALCIUM CHLORIDE 600; 310; 30; 20 MG/100ML; MG/100ML; MG/100ML; MG/100ML
INJECTION, SOLUTION INTRAVENOUS CONTINUOUS
Status: DISCONTINUED | OUTPATIENT
Start: 2017-12-13 | End: 2017-12-13 | Stop reason: HOSPADM

## 2017-12-13 RX ORDER — NITROFURANTOIN 25; 75 MG/1; MG/1
100 CAPSULE ORAL DAILY
COMMUNITY
End: 2018-05-17 | Stop reason: ALTCHOICE

## 2017-12-13 RX ORDER — PROPOFOL 10 MG/ML
INJECTION, EMULSION INTRAVENOUS PRN
Status: DISCONTINUED | OUTPATIENT
Start: 2017-12-13 | End: 2017-12-13 | Stop reason: SDUPTHER

## 2017-12-13 RX ADMIN — LIDOCAINE HYDROCHLORIDE 50 MG: 20 INJECTION, SOLUTION INFILTRATION; PERINEURAL at 10:27

## 2017-12-13 RX ADMIN — SODIUM CHLORIDE, POTASSIUM CHLORIDE, SODIUM LACTATE AND CALCIUM CHLORIDE: 600; 310; 30; 20 INJECTION, SOLUTION INTRAVENOUS at 09:59

## 2017-12-13 RX ADMIN — PROPOFOL 20 MG: 10 INJECTION, EMULSION INTRAVENOUS at 10:27

## 2017-12-13 ASSESSMENT — PULMONARY FUNCTION TESTS
PIF_VALUE: 1

## 2017-12-13 ASSESSMENT — PAIN - FUNCTIONAL ASSESSMENT: PAIN_FUNCTIONAL_ASSESSMENT: 0-10

## 2017-12-13 NOTE — ANESTHESIA POSTPROCEDURE EVALUATION
Department of Anesthesiology  Postprocedure Note    Patient: Yovani Rodriguez  MRN: [id number]  YOB: 1950  Date of evaluation: 12/13/2017  Time:  10:58 AM     Procedure Summary     Date:  12/13/17 Room / Location:  [facility] M2 / [facility] OR    Anesthesia Start:  1024 Anesthesia Stop:  1046    Procedure:  COLONOSCOPY, ATTEMPTED / POOR -PREP (N/A ) Diagnosis:  (SCREENING)    Surgeon:  Mervat Herrera MD Responsible Provider:  Renata Lugo MD    Anesthesia Type:  MAC ASA Status:  4          Anesthesia Type: MAC    Lesli Phase I:      Lesli Phase II:      Last vitals: Reviewed and per EMR flowsheets.        Anesthesia Post Evaluation    Patient location during evaluation: PACU  Patient participation: waiting for patient participation  Level of consciousness: sleepy but conscious  Airway patency: patent  Nausea & Vomiting: no nausea and no vomiting  Complications: no  Cardiovascular status: hemodynamically stable  Respiratory status: acceptable  Hydration status: euvolemic

## 2017-12-13 NOTE — H&P
HISTORY and PHYSICAL    NAME:  Jorge Bolton  MRN: [id number]   YOB: 1950   Date: 12/13/2017   Age: 69 y.o. Gender: female         COMPLAINT AND PRESENT HISTORY: Jorge Bolton is a 69 y.o. female who is scheduled to have  a screening colonoscopy. Her care giver at the bedside reports that she intermittent has bright red blood on toilet paper after a stool otherwise no other problems known with the gastrointestinal system. She is profoundly retarded as reported by the care giver from patient records from the facility. She also has a diagnosis of schizophrenia and lives at a group home. She currently is sedated as she was given 2 mg of Ativan orally prior to coming to the hospital due to agitated behaviors the care giver reports. She does not answer questions but does establish eye contact. Respirations easy. She is arousable but falls quickly back to sleep. No previous colonoscopy. Diagnosis:  Screening colonoscopy        Past Medical History:   Diagnosis Date    Allergic rhinitis     Bipolar affective ([address]     Constipation     Contracture of muscle of multiple sites     bilateral upper extremities.  Dermatomyositis ([address]     Dysphagia     Hypercholesterolemia     Hypertension     Mental retardation, profound (I.Q. < 20)     Neuroleptic-induced tardive dyskinesia     PTSD (post-traumatic stress disorder)     Pyloric ulcer     w stenosis    Schizophrenia ([address]        Past Surgical History:   Procedure Laterality Date    ABDOMEN SURGERY      pyloric ulcer surgery       Pt denies any history of diabetes, heart disease, stroke or cancer. History reviewed. No pertinent family history.       Social History     Social History    Marital status: Single     Spouse name: N/A    Number of children: N/A    Years of education: N/A     Social History Main Topics    Smoking status: Never Smoker    Smokeless tobacco: Never Used    Alcohol use No    Drug use: No    Sexual activity: by mouth daily as needed for Constipation      guaiFENesin (MUCINEX) 600 MG extended release tablet Take 600 mg by mouth 2 times daily as needed for Congestion      Multiple Vitamins-Minerals (MULTIVITAMIN PO) Take by mouth daily      polyethylene glycol (GLYCOLAX) packet Take 17 g by mouth daily as needed      senna (SENOKOT) 8.6 MG TABS tablet Take 1 tablet by mouth 2 times daily      acetaminophen (TYLENOL) 160 MG/5ML liquid Take 15 mg/kg by mouth every 4 hours as needed for Fever 20 ML Q 4 HRS      acetaminophen (TYLENOL) 325 MG tablet Take 650 mg by mouth every 4 hours as needed for Pain 2 Q 4 HRS      bisacodyl (DULCOLAX) 5 MG EC tablet Take 5 mg by mouth as needed for Constipation 1 EVERY 2 DAYS AS NEEDED      shark liver oil-cocoa butter (HEMORRHOID) 0.25-3-85.5 % suppository Place 1 suppository rectally as needed for Hemorrhoids      Tetrahydrozoline HCl (EYE DROPS OP) Apply to eye      BACITRACIN EX Apply topically            ROS:    GENERAL HEALTH:  Denies any problems with weight, appetite, or sleep. Skin:  No itching or rashes. No lesions. No easy bruising or bleeding. HEENT:  Denies cephalgia or head injury. No eye or ear pain. No sinusitis, rhinorhea or epistaxis. No frequent sorethroat, dysphagia or hoarseness. No masses, pain, stiffness or injury to the neck. Cardio-Respiratory: No hemoptysis, shortness of breath, chest pain, dizziness, orthopnea or palpitations. Gastrointestinal:  No constipation, diarrhea, daniel stools, red or black in the stool. No nausea or vomiting. Genitourinary:  No dysuria, hematuria, discharge, incontinence. No recent urinary tract infection. Locomotor:  Denies bone, joint or muscle  problems. No need for assistance with ambulation. Neuro:  Denies  neuropathy, syncopal episodes, weakness, vertigo, arthralgia, myalgia or numbness or tingling. Behavioral/Psych:  Pt denies current feeling of depression or significant anxiety. GENERAL PHYSICAL EXAM:            Vitals: BP (!) 155/80   Pulse 65   Temp 97.7 °F (36.5 °C)   Resp 16   Ht 5' 6\" (1.676 m)   Wt 187 lb (84.8 kg)   SpO2 99%   BMI 30.18 kg/m²  Body mass index is 30.18 kg/m². GENERAL APPEARANCE:  Contreras Rahman is a 69 y.o. female,  nourished, conscious, alert. Does not appear to be in distress or pain at this time. Skin:  Appears warm and dry without jaundice or cyanosis. No rashes or lesions. HEENT:  No facial swelling or tenderness. No  scleral icterus. No drainage from the ears, eyes or nose. Moist mucous membranes. No jugular vein distention. Carotid pulses present without bruit. Chest:  Symmetrical with equal expansion. Heart:  Regular rate and rhythm without murmur or rub. No extra heart sounds. Lungs: Clear to ausculation without rhonchi or wheeze. Nonlabored. Abdomen:  Soft, nontender. No flank pain with percussion. Lymphatics:  No palpable cervical or supraclavicular lymphadenopathy. Extremities:  Radial pulses 2+. Pedal pulses 2+. No edema. No calf tenderness. Negative elles sign. Locomotor/ Back/Spine:  No para spinus tenderness. 5/5 strength upper and lower extremities. Neurological/Behavioral  Alert and oriented. Able to move all extremities. No facial droop. No slurred speech. Cranial nerves 2-12  grossly intact.                           Patient Active Problem List    Diagnosis Date Noted    Hyperglycemia 08/24/2016    Bipolar 1 disorder ([facility] [address] 11/30/2015    Nonpsychotic mental disorder  11/30/2015    Hyperlipidemia 10/16/2015    Constipation 10/16/2015    Anxiety 10/16/2015    Screening for condition 10/16/2015               Debora Romero CNP on 12/13/2017 at 10:13 AM

## 2017-12-13 NOTE — OP NOTE
[facility]     REFERRING PHYSICIAN: No ref. provider found     PRIMARY CARE PROVIDER: Melodie Joshua MD    ATTENDING PHYSICIAN: Tariq Lee MD     HISTORY: Ms. Supriya Grijalva is a 69 y.o. female who presents to the [place] [address] Endoscopy unit for colonoscopy. The patient's clinical history is remarkable for rectal bleeding. She is currently medically stable and appropriate for the planned procedure. PREOPERATIVE DIAGNOSIS: rectal bleeding. PROCEDURES:   Transanal sigmoidoscopy --diagnostic. POSTPROCEDURE DIAGNOSIS:  Poor prep  No blood or gross lesions    MEDICATIONS:     MAC per anesthesia    INSTRUMENT: Olympus PCF-H180AL flexible Colonoscope. PREPARATION: The nature and character of the procedure as well as risks, benefits, and alternatives were discussed with the patient and informed consent was obtained. Complications were said to include, but were not limited to: medication allergy, medication reaction, cardiovascular and respiratory problems, bleeding, perforation, infection, and/or missed diagnosis. Following arrival in the endoscopy room, the patient was placed in the left lateral decubitus position and final time-out accomplished in the presence of the nursing staff. Baseline vital signs were obtained and reviewed, and IV sedation was subsequently initiated. FINDINGS: Rectal examination demonstrated no significant visible external abnormality and digital palpation was unremarkable. Following adequate conscious sedation the colonoscope was introduced and advanced under direct visualization to the sigmoid colon. The bowel preparation was felt to be poor. This included copious amounts of thick stool that was not able to be adequately irrigated and aspirated. Once maximally inserted, the endoscope was withdrawn and the mucosa was carefully inspected. The mucosal exam was normal. Procedure was aborted due to poor prep.          RECOMMENDATIONS:   1)

## 2017-12-13 NOTE — ANESTHESIA PRE PROCEDURE
Department of Anesthesiology  Preprocedure Note       Name:  Reina Gonzales   Age:  69 y.o.  :  1950                                          MRN:  [id number]         Date:  2017      Surgeon: Mima Lloyd):  Socrates Pompa MD    Procedure: Procedure(s):  COLONOSCOPY    Medications prior to admission:   Prior to Admission medications    Medication Sig Start Date End Date Taking? Authorizing Provider   valproate (DEPAKENE) 250 MG/5ML solution Take 25 mLs by mouth nightly 17   Alka Stallings MD   Sodium Phosphates (FLEET) 7-19 GM/118ML Place 1 enema rectally once as needed    Historical Provider, MD   hydrocortisone (ANUSOL-HC) 2.5 % rectal cream Place rectally 2 times daily Place rectally 2 times daily.     Historical Provider, MD   Dextromethorphan-guaiFENesin  MG/5ML SYRP Take 5 mLs by mouth every 4 hours as needed for Cough    Historical Provider, MD   traZODone (DESYREL) 100 MG tablet Take 100 mg by mouth nightly    Historical Provider, MD   diphenhydrAMINE (BENADRYL) 25 MG capsule Take 25 mg by mouth every 6 hours as needed for Itching or Allergies    Historical Provider, MD   ibuprofen (ADVIL;MOTRIN) 200 MG tablet Take 200 mg by mouth every 6 hours as needed for Pain    Historical Provider, MD   magnesium hydroxide (MILK OF MAGNESIA) 400 MG/5ML suspension Take 30 mLs by mouth daily as needed for Constipation    Historical Provider, MD   guaiFENesin (MUCINEX) 600 MG extended release tablet Take 600 mg by mouth 2 times daily as needed for Congestion    Historical Provider, MD   Saccharomyces boulardii (PROBIOTIC) 250 MG CAPS Take by mouth    Historical Provider, MD   famotidine (PEPCID) 20 MG tablet Take 20 mg by mouth 2 times daily    Historical Provider, MD   ipratropium (ATROVENT) 0.03 % nasal spray 2 sprays by Nasal route 2 times daily    Historical Provider, MD   lactulose (CHRONULAC) 10 GM/15ML solution Take 20 g by mouth nightly    Historical Provider, MD   Multiple Vitamins-Minerals (MULTIVITAMIN PO) Take by mouth daily    Historical Provider, MD   polyethylene glycol (GLYCOLAX) packet Take 17 g by mouth daily as needed    Historical Provider, MD   propranolol (INDERAL) 60 MG tablet Take 60 mg by mouth 2 times daily 2 BID    Historical Provider, MD   QUEtiapine (SEROQUEL) 300 MG tablet Take 300 mg by mouth Daily    Historical Provider, MD   QUEtiapine (SEROQUEL) 400 MG tablet Take 400 mg by mouth daily    Historical Provider, MD   senna (SENOKOT) 8.6 MG TABS tablet Take 1 tablet by mouth 2 times daily    Historical Provider, MD   simvastatin (ZOCOR) 40 MG tablet Take 40 mg by mouth nightly    Historical Provider, MD   trihexyphenidyl (ARTANE) 2 MG tablet Take 2 mg by mouth nightly    Historical Provider, MD   acetaminophen (TYLENOL) 160 MG/5ML liquid Take 15 mg/kg by mouth every 4 hours as needed for Fever 20 ML Q 4 HRS    Historical Provider, MD   acetaminophen (TYLENOL) 325 MG tablet Take 650 mg by mouth every 4 hours as needed for Pain 2 Q 4 HRS    Historical Provider, MD   bisacodyl (DULCOLAX) 5 MG EC tablet Take 5 mg by mouth as needed for Constipation 1 EVERY 2 DAYS AS NEEDED    Historical Provider, MD   shark liver oil-cocoa butter (HEMORRHOID) 0.25-3-85.5 % suppository Place 1 suppository rectally as needed for Hemorrhoids    Historical Provider, MD   Tetrahydrozoline HCl (EYE DROPS OP) Apply to eye    Historical Provider, MD   BACITRACIN EX Apply topically    Historical Provider, MD   LORazepam (ATIVAN) 2 MG tablet Take 1 mg by mouth as needed for Anxiety 1 [facility]     Historical Provider, MD       Current medications:    No current facility-administered medications for this encounter. 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  [facility]     Dysphagia     Hypercholesterolemia     Hypertension     Moderate intellectual disabilities     Neuroleptic-induced tardive dyskinesia     PTSD (post-traumatic stress disorder)     Pyloric ulcer     w stenosis    Schizophrenia ([facility]        Past Surgical History:  No past surgical history on file. Social History:    Social History   Substance Use Topics    Smoking status: Never Smoker    Smokeless tobacco: Never Used    Alcohol use No                                Counseling given: Not Answered      Vital Signs (Current): There were no vitals filed for this visit.                                            BP Readings from Last 3 Encounters:   10/03/17 116/66   08/22/17 110/69   08/15/17 (!) 149/133       NPO Status:                                                                                 BMI:   Wt Readings from Last 3 Encounters:   10/03/17 173 lb (78.5 kg)   08/22/17 188 lb (85.3 kg)   08/15/17 185 lb (83.9 kg)     There is no height or weight on file to calculate BMI.    CBC:   Lab Results   Component Value Date    WBC 10.3 08/09/2017    RBC 4.47 08/09/2017    HGB 13.1 08/09/2017    HCT 40.4 08/09/2017    MCV 90.4 08/09/2017    RDW 14.0 08/09/2017     08/09/2017       CMP:   Lab Results   Component Value Date     08/10/2017    K 5.1 08/10/2017    CL 98 08/10/2017    CO2 22 08/10/2017    BUN 10 08/10/2017    CREATININE 0.60 08/10/2017    GFRAA >60 08/10/2017    LABGLOM >60 08/10/2017    GLUCOSE 111 08/10/2017    PROT 7.4 08/10/2017    CALCIUM 8.8 08/10/2017    BILITOT 0.20 08/10/2017    ALKPHOS 68 08/10/2017    AST 44

## 2017-12-26 ENCOUNTER — TELEPHONE (OUTPATIENT)
Dept: GASTROENTEROLOGY | Age: 67
End: 2017-12-26

## 2017-12-26 NOTE — TELEPHONE ENCOUNTER
Patient had attempted colonoscopy on 12/13 but had poor prep. Needs rescheduled with 2-day prep. Thank you!

## 2017-12-29 RX ORDER — POLYETHYLENE GLYCOL 3350 17 G/17G
POWDER, FOR SOLUTION ORAL
Qty: 255 G | Refills: 0 | Status: SHIPPED | OUTPATIENT
Start: 2017-12-29 | End: 2018-01-26

## 2018-02-07 ENCOUNTER — ANESTHESIA EVENT (OUTPATIENT)
Dept: OPERATING ROOM | Age: 68
End: 2018-02-07
Payer: MEDICARE

## 2018-02-07 RX ORDER — SODIUM CHLORIDE 9 MG/ML
INJECTION, SOLUTION INTRAVENOUS CONTINUOUS
Status: CANCELLED | OUTPATIENT
Start: 2018-02-07

## 2018-02-08 ENCOUNTER — ANESTHESIA (OUTPATIENT)
Dept: OPERATING ROOM | Age: 68
End: 2018-02-08
Payer: MEDICARE

## 2018-02-08 ENCOUNTER — HOSPITAL ENCOUNTER (OUTPATIENT)
Age: 68
Setting detail: OUTPATIENT SURGERY
Discharge: HOME | End: 2018-02-08
Attending: INTERNAL MEDICINE | Admitting: INTERNAL MEDICINE
Payer: MEDICARE

## 2018-02-08 VITALS
RESPIRATION RATE: 12 BRPM | DIASTOLIC BLOOD PRESSURE: 66 MMHG | SYSTOLIC BLOOD PRESSURE: 143 MMHG | OXYGEN SATURATION: 100 %

## 2018-02-08 VITALS
RESPIRATION RATE: 11 BRPM | HEART RATE: 58 BPM | TEMPERATURE: 97 F | SYSTOLIC BLOOD PRESSURE: 170 MMHG | WEIGHT: 167 LBS | OXYGEN SATURATION: 100 % | BODY MASS INDEX: 26.84 KG/M2 | HEIGHT: 66 IN | DIASTOLIC BLOOD PRESSURE: 81 MMHG

## 2018-02-08 PROCEDURE — 2580000003 HC RX 258: Performed by: ANESTHESIOLOGY

## 2018-02-08 PROCEDURE — 3700000000 HC ANESTHESIA ATTENDED CARE: Performed by: INTERNAL MEDICINE

## 2018-02-08 PROCEDURE — 3700000001 HC ADD 15 MINUTES (ANESTHESIA): Performed by: INTERNAL MEDICINE

## 2018-02-08 PROCEDURE — 7100000010 HC PHASE II RECOVERY - FIRST 15 MIN: Performed by: INTERNAL MEDICINE

## 2018-02-08 PROCEDURE — 7100000011 HC PHASE II RECOVERY - ADDTL 15 MIN: Performed by: INTERNAL MEDICINE

## 2018-02-08 PROCEDURE — 45378 DIAGNOSTIC COLONOSCOPY: CPT | Performed by: INTERNAL MEDICINE

## 2018-02-08 PROCEDURE — 2580000003 HC RX 258: Performed by: NURSE ANESTHETIST, CERTIFIED REGISTERED

## 2018-02-08 PROCEDURE — 6360000002 HC RX W HCPCS: Performed by: NURSE ANESTHETIST, CERTIFIED REGISTERED

## 2018-02-08 PROCEDURE — 2500000003 HC RX 250 WO HCPCS: Performed by: NURSE ANESTHETIST, CERTIFIED REGISTERED

## 2018-02-08 PROCEDURE — 3609027000 HC COLONOSCOPY: Performed by: INTERNAL MEDICINE

## 2018-02-08 RX ORDER — LIDOCAINE HYDROCHLORIDE 20 MG/ML
INJECTION, SOLUTION EPIDURAL; INFILTRATION; INTRACAUDAL; PERINEURAL PRN
Status: DISCONTINUED | OUTPATIENT
Start: 2018-02-08 | End: 2018-02-08 | Stop reason: SDUPTHER

## 2018-02-08 RX ORDER — LIDOCAINE HYDROCHLORIDE 10 MG/ML
1 INJECTION, SOLUTION EPIDURAL; INFILTRATION; INTRACAUDAL; PERINEURAL
Status: DISCONTINUED | OUTPATIENT
Start: 2018-02-08 | End: 2018-02-08 | Stop reason: HOSPADM

## 2018-02-08 RX ORDER — SODIUM CHLORIDE 0.9 % (FLUSH) 0.9 %
10 SYRINGE (ML) INJECTION EVERY 12 HOURS SCHEDULED
Status: DISCONTINUED | OUTPATIENT
Start: 2018-02-08 | End: 2018-02-08 | Stop reason: HOSPADM

## 2018-02-08 RX ORDER — SODIUM CHLORIDE, SODIUM LACTATE, POTASSIUM CHLORIDE, CALCIUM CHLORIDE 600; 310; 30; 20 MG/100ML; MG/100ML; MG/100ML; MG/100ML
INJECTION, SOLUTION INTRAVENOUS CONTINUOUS PRN
Status: DISCONTINUED | OUTPATIENT
Start: 2018-02-08 | End: 2018-02-08 | Stop reason: SDUPTHER

## 2018-02-08 RX ORDER — PROPOFOL 10 MG/ML
INJECTION, EMULSION INTRAVENOUS PRN
Status: DISCONTINUED | OUTPATIENT
Start: 2018-02-08 | End: 2018-02-08 | Stop reason: SDUPTHER

## 2018-02-08 RX ORDER — SODIUM CHLORIDE 0.9 % (FLUSH) 0.9 %
10 SYRINGE (ML) INJECTION PRN
Status: DISCONTINUED | OUTPATIENT
Start: 2018-02-08 | End: 2018-02-08 | Stop reason: HOSPADM

## 2018-02-08 RX ORDER — SODIUM CHLORIDE, SODIUM LACTATE, POTASSIUM CHLORIDE, CALCIUM CHLORIDE 600; 310; 30; 20 MG/100ML; MG/100ML; MG/100ML; MG/100ML
INJECTION, SOLUTION INTRAVENOUS CONTINUOUS
Status: DISCONTINUED | OUTPATIENT
Start: 2018-02-08 | End: 2018-02-08 | Stop reason: HOSPADM

## 2018-02-08 RX ADMIN — PROPOFOL 20 MG: 10 INJECTION, EMULSION INTRAVENOUS at 09:54

## 2018-02-08 RX ADMIN — LIDOCAINE HYDROCHLORIDE 100 MG: 20 INJECTION, SOLUTION EPIDURAL; INFILTRATION; INTRACAUDAL; PERINEURAL at 09:47

## 2018-02-08 RX ADMIN — SODIUM CHLORIDE, POTASSIUM CHLORIDE, SODIUM LACTATE AND CALCIUM CHLORIDE: 600; 310; 30; 20 INJECTION, SOLUTION INTRAVENOUS at 09:44

## 2018-02-08 RX ADMIN — PROPOFOL 30 MG: 10 INJECTION, EMULSION INTRAVENOUS at 09:47

## 2018-02-08 RX ADMIN — PROPOFOL 20 MG: 10 INJECTION, EMULSION INTRAVENOUS at 09:51

## 2018-02-08 RX ADMIN — PROPOFOL 30 MG: 10 INJECTION, EMULSION INTRAVENOUS at 09:57

## 2018-02-08 RX ADMIN — SODIUM CHLORIDE, POTASSIUM CHLORIDE, SODIUM LACTATE AND CALCIUM CHLORIDE: 600; 310; 30; 20 INJECTION, SOLUTION INTRAVENOUS at 08:55

## 2018-02-08 RX ADMIN — PROPOFOL 30 MG: 10 INJECTION, EMULSION INTRAVENOUS at 10:00

## 2018-02-08 ASSESSMENT — PULMONARY FUNCTION TESTS
PIF_VALUE: 1

## 2018-02-08 NOTE — ANESTHESIA POSTPROCEDURE EVALUATION
Department of Anesthesiology  Postprocedure Note    Patient: Warden Hull  MRN: [id number]  YOB: 1950  Date of evaluation: 2/8/2018  Time:  3:42 PM     Procedure Summary     Date:  02/08/18 Room / Location:  [place] [address] / [facility] OR    Anesthesia Start:  9858 Anesthesia Stop:  1013    Procedure:  COLONOSCOPY (N/A ) Diagnosis:  (SCREENING)    Surgeon:  Silvia Ledesma MD Responsible Provider:  Fransisco Villarreal DO    Anesthesia Type:  general, MAC ASA Status:  3          Anesthesia Type: general, MAC    Lesli Phase I:      Lesli Phase II: Lesli Score: 10    Last vitals: Reviewed and per EMR flowsheets.        Anesthesia Post Evaluation    Patient location during evaluation: PACU  Patient participation: complete - patient participated  Level of consciousness: awake and alert  Airway patency: patent  Nausea & Vomiting: no nausea and no vomiting  Complications: no  Cardiovascular status: hemodynamically stable  Respiratory status: acceptable  Hydration status: stable

## 2018-02-08 NOTE — ANESTHESIA PRE PROCEDURE
Department of Anesthesiology  Preprocedure Note       Name:  Dennie Feast   Age:  69 y.o.  :  1950                                          MRN:  [id number]         Date:  2018      Surgeon: Keyshawn Cabral):  Mary Beth Anaya MD    Procedure: Procedure(s):  COLONOSCOPY    Medications prior to admission:   Prior to Admission medications    Medication Sig Start Date End Date Taking?  Authorizing Provider   nitrofurantoin, macrocrystal-monohydrate, (MACROBID) 100 MG capsule Take 100 mg by mouth daily   Yes Historical Provider, MD   valproate (DEPAKENE) 250 MG/5ML solution Take 25 mLs by mouth nightly 17  Yes Aggie Mo MD   Sodium Phosphates (FLEET) 7-19 GM/118ML Place 1 enema rectally once as needed   Yes Historical Provider, MD   traZODone (DESYREL) 100 MG tablet Take 100 mg by mouth nightly   Yes Historical Provider, MD   ibuprofen (ADVIL;MOTRIN) 200 MG tablet Take 200 mg by mouth every 6 hours as needed for Pain   Yes Historical Provider, MD   magnesium hydroxide (MILK OF MAGNESIA) 400 MG/5ML suspension Take 30 mLs by mouth daily as needed for Constipation   Yes Historical Provider, MD   Saccharomyces boulardii (PROBIOTIC) 250 MG CAPS Take by mouth   Yes Historical Provider, MD   famotidine (PEPCID) 20 MG tablet Take 20 mg by mouth 2 times daily   Yes Historical Provider, MD   ipratropium (ATROVENT) 0.03 % nasal spray 2 sprays by Nasal route 2 times daily   Yes Historical Provider, MD   lactulose (CHRONULAC) 10 GM/15ML solution Take 20 g by mouth nightly   Yes Historical Provider, MD   Multiple Vitamins-Minerals (MULTIVITAMIN PO) Take by mouth daily   Yes Historical Provider, MD   polyethylene glycol (GLYCOLAX) packet Take 17 g by mouth daily as needed   Yes Historical Provider, MD   propranolol (INDERAL) 60 MG tablet Take 60 mg by mouth 2 times daily 2 BID   Yes Historical Provider, MD   QUEtiapine (SEROQUEL) 300 MG tablet Take 300 mg by mouth every morning    Yes Historical Provider, MD   QUEtiapine (SEROQUEL) 400 MG tablet Take 400 mg by mouth daily   Yes Historical Provider, MD   senna (SENOKOT) 8.6 MG TABS tablet Take 1 tablet by mouth 2 times daily   Yes Historical Provider, MD   simvastatin (ZOCOR) 40 MG tablet Take 40 mg by mouth nightly   Yes Historical Provider, MD   trihexyphenidyl (ARTANE) 2 MG tablet Take 2 mg by mouth nightly   Yes Historical Provider, MD   acetaminophen (TYLENOL) 160 MG/5ML liquid Take 15 mg/kg by mouth every 4 hours as needed for Fever 20 ML Q 4 HRS   Yes Historical Provider, MD   acetaminophen (TYLENOL) 325 MG tablet Take 650 mg by mouth every 4 hours as needed for Pain 2 Q 4 HRS   Yes Historical Provider, MD   bisacodyl (DULCOLAX) 5 MG EC tablet Take 5 mg by mouth as needed for Constipation 1 EVERY 2 DAYS AS NEEDED   Yes Historical Provider, MD   LORazepam (ATIVAN) 2 MG tablet Take 1 mg by mouth as needed for Anxiety 1 HR BEFORE DENTAL PROCEDURES    Yes Historical Provider, MD   hydrocortisone (ANUSOL-HC) 2.5 % rectal cream Place rectally 2 times daily Place rectally 2 times daily.     Historical Provider, MD   Dextromethorphan-guaiFENesin  MG/5ML SYRP Take 5 mLs by mouth every 4 hours as needed for Cough    Historical Provider, MD   diphenhydrAMINE (BENADRYL) 25 MG capsule Take 25 mg by mouth every 6 hours as needed for Itching or Allergies    Historical Provider, MD   guaiFENesin (MUCINEX) 600 MG extended release tablet Take 600 mg by mouth 2 times daily as needed for Congestion    Historical Provider, MD   shark liver oil-cocoa butter (HEMORRHOID) 0.25-3-85.5 % suppository Place 1 suppository rectally as needed for Hemorrhoids    Historical Provider, MD   Tetrahydrozoline HCl (EYE DROPS OP) Apply to eye    Historical Provider, MD   BACITRACIN EX Apply topically    Historical Provider, MD       Current medications:    Current Facility-Administered Medications   Medication Dose Route Frequency Provider Last Rate Last Dose    lactated ringers infusion   Intravenous Past Surgical History:        Procedure Laterality Date    ABDOMEN SURGERY      pyloric ulcer surgery    COLONOSCOPY  12/13/2017    attempted-poor prep    COLONOSCOPY  02/08/2018    ID COLON CA SCRN NOT HI RSK IND N/A 12/13/2017    COLONOSCOPY, ATTEMPTED / POOR -PREP performed by Farida Ly MD at [address] IND N/A 2/8/2018    COLONOSCOPY performed by Farida Ly MD at [place] [address] History:    Social History   Substance Use Topics    Smoking status: Never Smoker    Smokeless tobacco: Never Used    Alcohol use No                                Counseling given: Not Answered      Vital Signs (Current):   Vitals:    02/08/18 1009 02/08/18 1015 02/08/18 1030 02/08/18 1045   BP:  131/62 (!) 148/66 (!) 170/81   Pulse: 59 59 56 58   Resp: 10 10 11 11   Temp: 97.2 °F (36.2 °C)   97 °F (36.1 °C)   TempSrc: Temporal   Temporal   SpO2: 100% 100% 100% 100%   Weight:       Height:                                                  BP Readings from Last 3 Encounters:   02/08/18 (!) 170/81   12/13/17 (!) 174/81   12/13/17 (!) 148/67       NPO Status: Time of last liquid consumption: 2300                        Time of last solid consumption: 2100                        Date of last liquid consumption: 02/07/18                        Date of last solid food consumption: 02/05/18    BMI:   Wt Readings from Last 3 Encounters:   02/08/18 167 lb (75.8 kg)   12/13/17 187 lb (84.8 kg)   10/03/17 173 lb (78.5 kg)     Body mass index is 26.95 kg/m².     CBC:   Lab Results   Component Value Date    WBC 10.3 08/09/2017    RBC 4.47 08/09/2017    HGB 13.1 08/09/2017    HCT 40.4 08/09/2017    MCV 90.4 08/09/2017    RDW 14.0 08/09/2017     08/09/2017       CMP:   Lab Results   Component Value Date     08/10/2017    K 5.1 08/10/2017    CL 98 08/10/2017    CO2 22 08/10/2017    BUN 10 08/10/2017    CREATININE 0.60 08/10/2017    GFRAA >60 08/10/2017    LABGLOM >60 08/10/2017 GLUCOSE 111 08/10/2017    PROT 7.4 08/10/2017    CALCIUM 8.8 08/10/2017    BILITOT 0.20 08/10/2017    ALKPHOS 68 08/10/2017    AST 44 08/10/2017    ALT 14 08/10/2017       POC Tests: No results for input(s): POCGLU, POCNA, POCK, POCCL, POCBUN, POCHEMO, POCHCT in the last 72 hours. Coags: No results found for: PROTIME, INR, APTT    HCG (If Applicable): No results found for: PREGTESTUR, PREGSERUM, HCG, HCGQUANT     ABGs: No results found for: PHART, PO2ART, WPN1IKX, CHF5ZAD, BEART, [id number]     Type & Screen (If Applicable):  No results found for: LABABO, [address]    Anesthesia Evaluation  Patient summary reviewed and Nursing notes reviewed no history of anesthetic complications:   Airway: Mallampati: III  TM distance: >3 FB   Neck ROM: full  Mouth opening: > = 3 FB Dental: normal exam         Pulmonary:normal exam        (-) COPD and asthma                           Cardiovascular:  Exercise tolerance: no interval change,   (+) hypertension:,     (-) past MI and CAD      Rhythm: regular  Rate: normal           Beta Blocker:  Not on Beta Blocker         Neuro/Psych:   (+) neuromuscular disease:, psychiatric history:            GI/Hepatic/Renal:   (+) PUD,           Endo/Other:    (+) : arthritis:., .                 Abdominal:           Vascular:                                        Anesthesia Plan      MAC and general     ASA 3       Induction: intravenous. Anesthetic plan and risks discussed with patient (caregiver). Plan discussed with CRNA.     Attending anesthesiologist reviewed and agrees with Pre Eval content              Sagrario Aguilar DO   2/8/2018

## 2018-03-01 ENCOUNTER — HOSPITAL ENCOUNTER (OUTPATIENT)
Dept: MAMMOGRAPHY | Age: 68
Discharge: HOME | End: 2018-03-01
Payer: MEDICARE

## 2018-03-01 ENCOUNTER — HOSPITAL ENCOUNTER (OUTPATIENT)
Dept: MAMMOGRAPHY | Age: 68
Discharge: HOME | End: 2018-03-03
Payer: MEDICARE

## 2018-03-01 ENCOUNTER — HOSPITAL ENCOUNTER (OUTPATIENT)
Age: 68
Discharge: HOME | End: 2018-03-01
Payer: MEDICARE

## 2018-03-01 ENCOUNTER — TELEPHONE (OUTPATIENT)
Dept: FAMILY MEDICINE CLINIC | Age: 68
End: 2018-03-01

## 2018-03-01 ENCOUNTER — HOSPITAL ENCOUNTER (OUTPATIENT)
Dept: ULTRASOUND IMAGING | Age: 68
Discharge: HOME | End: 2018-03-03
Payer: MEDICARE

## 2018-03-01 DIAGNOSIS — Z78.0 POST-MENOPAUSAL: Primary | ICD-10-CM

## 2018-03-01 DIAGNOSIS — Z78.0 POST-MENOPAUSAL: ICD-10-CM

## 2018-03-01 LAB
EKG ATRIAL RATE: 61 BPM
EKG P AXIS: 52 DEGREES
EKG P-R INTERVAL: 150 MS
EKG Q-T INTERVAL: 410 MS
EKG QRS DURATION: 70 MS
EKG QTC CALCULATION (BAZETT): 412 MS
EKG R AXIS: 10 DEGREES
EKG T AXIS: 4 DEGREES
EKG VENTRICULAR RATE: 61 BPM

## 2018-03-01 PROCEDURE — 77080 DXA BONE DENSITY AXIAL: CPT

## 2018-03-01 PROCEDURE — 76770 US EXAM ABDO BACK WALL COMP: CPT

## 2018-03-01 PROCEDURE — 93005 ELECTROCARDIOGRAM TRACING: CPT

## 2018-03-02 ENCOUNTER — PATIENT MESSAGE (OUTPATIENT)
Dept: FAMILY MEDICINE CLINIC | Age: 68
End: 2018-03-02

## 2018-03-07 RX ORDER — LORAZEPAM 1 MG/1
1 TABLET ORAL PRN
Qty: 1 TABLET | Refills: 0 | Status: SHIPPED | OUTPATIENT
Start: 2018-03-07 | End: 2018-06-06 | Stop reason: SDUPTHER

## 2018-05-03 DIAGNOSIS — F31.9 BIPOLAR 1 DISORDER (HCC): Primary | ICD-10-CM

## 2018-05-03 RX ORDER — LORAZEPAM 1 MG/1
3 TABLET ORAL ONCE
Qty: 3 TABLET | Refills: 0 | Status: SHIPPED | OUTPATIENT
Start: 2018-05-03 | End: 2018-05-03

## 2018-05-08 ENCOUNTER — OFFICE VISIT (OUTPATIENT)
Dept: FAMILY MEDICINE CLINIC | Age: 68
End: 2018-05-08
Payer: MEDICARE

## 2018-05-08 VITALS
RESPIRATION RATE: 16 BRPM | HEIGHT: 66 IN | OXYGEN SATURATION: 94 % | SYSTOLIC BLOOD PRESSURE: 175 MMHG | DIASTOLIC BLOOD PRESSURE: 74 MMHG | HEART RATE: 67 BPM | BODY MASS INDEX: 26.86 KG/M2 | WEIGHT: 167.11 LBS

## 2018-05-08 DIAGNOSIS — F41.9 ANXIETY: ICD-10-CM

## 2018-05-08 DIAGNOSIS — I10 ESSENTIAL HYPERTENSION: ICD-10-CM

## 2018-05-08 DIAGNOSIS — F31.9 BIPOLAR 1 DISORDER (HCC): ICD-10-CM

## 2018-05-08 DIAGNOSIS — F48.9 NONPSYCHOTIC MENTAL DISORDER: ICD-10-CM

## 2018-05-08 DIAGNOSIS — F43.10 PTSD (POST-TRAUMATIC STRESS DISORDER): ICD-10-CM

## 2018-05-08 DIAGNOSIS — Z12.39 SCREENING FOR BREAST CANCER: ICD-10-CM

## 2018-05-08 DIAGNOSIS — E78.5 HYPERLIPIDEMIA, UNSPECIFIED HYPERLIPIDEMIA TYPE: ICD-10-CM

## 2018-05-08 DIAGNOSIS — K59.00 CONSTIPATION, UNSPECIFIED CONSTIPATION TYPE: ICD-10-CM

## 2018-05-08 DIAGNOSIS — Z23 NEED FOR SHINGLES VACCINE: Primary | ICD-10-CM

## 2018-05-08 DIAGNOSIS — R31.9 HEMATURIA, UNSPECIFIED TYPE: ICD-10-CM

## 2018-05-08 PROCEDURE — G8427 DOCREV CUR MEDS BY ELIG CLIN: HCPCS | Performed by: FAMILY MEDICINE

## 2018-05-08 PROCEDURE — 1090F PRES/ABSN URINE INCON ASSESS: CPT | Performed by: FAMILY MEDICINE

## 2018-05-08 PROCEDURE — G8399 PT W/DXA RESULTS DOCUMENT: HCPCS | Performed by: FAMILY MEDICINE

## 2018-05-08 PROCEDURE — G8417 CALC BMI ABV UP PARAM F/U: HCPCS | Performed by: FAMILY MEDICINE

## 2018-05-08 PROCEDURE — 1036F TOBACCO NON-USER: CPT | Performed by: FAMILY MEDICINE

## 2018-05-08 PROCEDURE — 4040F PNEUMOC VAC/ADMIN/RCVD: CPT | Performed by: FAMILY MEDICINE

## 2018-05-08 PROCEDURE — 99214 OFFICE O/P EST MOD 30 MIN: CPT | Performed by: FAMILY MEDICINE

## 2018-05-08 PROCEDURE — 3017F COLORECTAL CA SCREEN DOC REV: CPT | Performed by: FAMILY MEDICINE

## 2018-05-08 PROCEDURE — 1123F ACP DISCUSS/DSCN MKR DOCD: CPT | Performed by: FAMILY MEDICINE

## 2018-05-08 RX ORDER — AMLODIPINE BESYLATE 5 MG/1
5 TABLET ORAL DAILY
Qty: 30 TABLET | Refills: 3 | Status: SHIPPED | OUTPATIENT
Start: 2018-05-08

## 2018-05-08 ASSESSMENT — ENCOUNTER SYMPTOMS
EYE PAIN: 0
SHORTNESS OF BREATH: 0
BLOOD IN STOOL: 0

## 2018-05-17 DIAGNOSIS — E78.5 HYPERLIPIDEMIA, UNSPECIFIED HYPERLIPIDEMIA TYPE: Primary | ICD-10-CM

## 2018-05-17 RX ORDER — ATORVASTATIN CALCIUM 40 MG/1
40 TABLET, FILM COATED ORAL DAILY
Qty: 90 TABLET | Refills: 3 | Status: SHIPPED | OUTPATIENT
Start: 2018-05-17

## 2018-06-06 DIAGNOSIS — F48.9 NONPSYCHOTIC MENTAL DISORDER: Primary | ICD-10-CM

## 2018-06-06 RX ORDER — LORAZEPAM 1 MG/1
TABLET ORAL
Qty: 5 TABLET | Refills: 0 | Status: SHIPPED | OUTPATIENT
Start: 2018-06-06 | End: 2018-06-14

## 2018-06-06 RX ORDER — LORAZEPAM 0.5 MG/1
TABLET ORAL
Qty: 5 TABLET | Refills: 0 | Status: SHIPPED | OUTPATIENT
Start: 2018-06-06 | End: 2018-06-13

## 2018-06-25 ENCOUNTER — HOSPITAL ENCOUNTER (OUTPATIENT)
Dept: MAMMOGRAPHY | Age: 68
Discharge: HOME | End: 2018-06-27
Payer: MEDICARE

## 2018-06-25 DIAGNOSIS — Z12.39 SCREENING FOR BREAST CANCER: ICD-10-CM

## 2018-06-25 PROCEDURE — 77063 BREAST TOMOSYNTHESIS BI: CPT

## 2018-07-09 DIAGNOSIS — F31.9 BIPOLAR 1 DISORDER (HCC): ICD-10-CM

## 2018-08-23 ENCOUNTER — OFFICE VISIT (OUTPATIENT)
Dept: FAMILY MEDICINE CLINIC | Age: 68
End: 2018-08-23
Payer: MEDICARE

## 2018-08-23 VITALS — RESPIRATION RATE: 16 BRPM | HEIGHT: 66 IN | WEIGHT: 168 LBS | BODY MASS INDEX: 27 KG/M2

## 2018-08-23 DIAGNOSIS — K59.00 CONSTIPATION, UNSPECIFIED CONSTIPATION TYPE: ICD-10-CM

## 2018-08-23 DIAGNOSIS — Z23 NEED FOR 23-POLYVALENT PNEUMOCOCCAL POLYSACCHARIDE VACCINE: ICD-10-CM

## 2018-08-23 DIAGNOSIS — R29.898 HAND DISORDER: Primary | ICD-10-CM

## 2018-08-23 DIAGNOSIS — I10 ESSENTIAL HYPERTENSION: ICD-10-CM

## 2018-08-23 DIAGNOSIS — F41.9 ANXIETY: ICD-10-CM

## 2018-08-23 DIAGNOSIS — F43.10 PTSD (POST-TRAUMATIC STRESS DISORDER): ICD-10-CM

## 2018-08-23 DIAGNOSIS — F31.9 BIPOLAR 1 DISORDER (HCC): ICD-10-CM

## 2018-08-23 DIAGNOSIS — F48.9 NONPSYCHOTIC MENTAL DISORDER: ICD-10-CM

## 2018-08-23 PROCEDURE — G8417 CALC BMI ABV UP PARAM F/U: HCPCS | Performed by: FAMILY MEDICINE

## 2018-08-23 PROCEDURE — 1036F TOBACCO NON-USER: CPT | Performed by: FAMILY MEDICINE

## 2018-08-23 PROCEDURE — 3017F COLORECTAL CA SCREEN DOC REV: CPT | Performed by: FAMILY MEDICINE

## 2018-08-23 PROCEDURE — G8399 PT W/DXA RESULTS DOCUMENT: HCPCS | Performed by: FAMILY MEDICINE

## 2018-08-23 PROCEDURE — 90732 PPSV23 VACC 2 YRS+ SUBQ/IM: CPT | Performed by: FAMILY MEDICINE

## 2018-08-23 PROCEDURE — G0009 ADMIN PNEUMOCOCCAL VACCINE: HCPCS | Performed by: FAMILY MEDICINE

## 2018-08-23 PROCEDURE — 99214 OFFICE O/P EST MOD 30 MIN: CPT | Performed by: FAMILY MEDICINE

## 2018-08-23 PROCEDURE — 4040F PNEUMOC VAC/ADMIN/RCVD: CPT | Performed by: FAMILY MEDICINE

## 2018-08-23 PROCEDURE — G8427 DOCREV CUR MEDS BY ELIG CLIN: HCPCS | Performed by: FAMILY MEDICINE

## 2018-08-23 PROCEDURE — 1090F PRES/ABSN URINE INCON ASSESS: CPT | Performed by: FAMILY MEDICINE

## 2018-08-23 PROCEDURE — 1101F PT FALLS ASSESS-DOCD LE1/YR: CPT | Performed by: FAMILY MEDICINE

## 2018-08-23 PROCEDURE — 1123F ACP DISCUSS/DSCN MKR DOCD: CPT | Performed by: FAMILY MEDICINE

## 2018-08-23 RX ORDER — LORAZEPAM 1 MG/1
TABLET ORAL
Qty: 6 TABLET | Refills: 0 | Status: SHIPPED | OUTPATIENT
Start: 2018-08-23 | End: 2018-11-07 | Stop reason: SDUPTHER

## 2018-08-23 ASSESSMENT — ENCOUNTER SYMPTOMS
SHORTNESS OF BREATH: 0
TROUBLE SWALLOWING: 0

## 2018-08-23 NOTE — PROGRESS NOTES
Jim lLamas MD  [facility]  [address]  Dept: [phone]    Beulah Thomson is a 77 y.o. female who presents today for her medical conditions/complaints as noted below. Beulah Thomson is here today c/o Other (not using right hand as should)       HPI:     HPI    Caregiver notes the patient is not using her right hand as she normally would - this started only a few days ago, she usually uses her right hand to feed herself but seems to be unable to over the past few days, staff has tried to hold their hand over her's to help her but she is not been willing to do so, staff now feeding her on their own, caregiver denies any history of injury or trauma, no numbness or pain that she complains about - she is nonverbal, no swelling or erythema of the skin of the hand, she continues to move both hands, no strokelike symptoms: No facial drooping, no weakness that she exhibits in the upper or lower extremities, no changes in her speech, her behavior has not had any changes    Resident of [employer], seen today with caregiver  [facility] psychiatry at [facility], on depakene, seroquel, trihexphenidyl, trazodone; Valproic acid level was normal at 76 in June 2018; caregiver say her behavior has been stable, no recent acting out, she does not appear to be depressed or anxious, no aggression, no self-injurious behavioral  HTN, On propranolol 60 mg qd, bp at home 130-150/70's, not complaining of chest pain, dyspnea, vision changes, headaches  Hx of ulcer and had G tube and this was removed many yars ago and now gets all of her meds crushed. On famotidine 20 mg bid. Completely oral fed now.   Constipation in remission, on miralax, lactulose, senna, prune juice daily, BMs are soft and regular, no blood or melena  Hyperlipidemia, on Zocor, LDL 90 on last check in Feb     Last colonoscopy Feb 2018, unable to visualize properly due to suboptimal prep, GI recommended repeating colonoscopy in 23 years    Due for Pneumovax today    Patient Active Problem List   Diagnosis    Hyperlipidemia    Constipation    Anxiety    Bipolar 1 disorder ([facility]    Nonpsychotic mental disorder     Hematuria    PTSD (post-traumatic stress disorder)    Essential hypertension       Past Medical History:   Diagnosis Date    Allergic rhinitis     Bipolar affective ([facility]     Constipation     Contracture of muscle of multiple sites     bilateral upper extremities.  Dermatomyositis ([facility]     Dysphagia     Hypercholesterolemia     Hypertension     Mental retardation, profound (I.Q. < 20)     Neuroleptic-induced tardive dyskinesia     PTSD (post-traumatic stress disorder)     Pyloric ulcer     w stenosis    Schizophrenia ([facility]      Past Surgical History:   Procedure Laterality Date    ABDOMEN SURGERY      pyloric ulcer surgery    COLONOSCOPY  12/13/2017    attempted-poor prep    COLONOSCOPY  02/08/2018    suboptimal prep; redundant colon; small internal hemorrhoids    RI COLON CA SCRN NOT HI RSK IND N/A 12/13/2017    COLONOSCOPY, ATTEMPTED / POOR -PREP performed by Tiffanie Elmore MD at [id number] [place] [address] NOT  [address] IND N/A 2/8/2018    COLONOSCOPY performed by Tiffanie Elmore MD at [facility]     No family history on file.   Social History   Substance Use Topics    Smoking status: Never Smoker    Smokeless tobacco: Never Used    Alcohol use No       Current Outpatient Prescriptions:     LORazepam (ATIVAN) 1 MG tablet, 3 mg before EMG, 2.5-3 mg before CT head., Disp: 6 tablet, Rfl: 0    atorvastatin (LIPITOR) 40 MG tablet, Take 1 tablet by mouth daily, Disp: 90 tablet, Rfl: 3    amLODIPine (NORVASC) 5 MG tablet, Take 1 tablet by mouth daily, Disp: 30 tablet, Rfl: 3    valproate (DEPAKENE) 250 MG/5ML solution, Take 25 mLs by mouth nightly, Disp: 1 Bottle, Rfl: 0    Sodium Phosphates (FLEET) 7-19 GM/118ML, Place 1 enema rectally once as

## 2018-08-29 LAB
ALBUMIN SERPL-MCNC: 3.71 G/DL
ALP BLD-CCNC: 74 U/L
ALT SERPL-CCNC: 10 U/L
ANION GAP SERPL CALCULATED.3IONS-SCNC: NORMAL MMOL/L
AST SERPL-CCNC: 19 U/L
BILIRUB SERPL-MCNC: 0.39 MG/DL (ref 0.1–1.4)
BUN BLDV-MCNC: 14 MG/DL
CALCIUM SERPL-MCNC: 9.1 MG/DL
CHLORIDE BLD-SCNC: 105 MMOL/L
CO2: 30 MMOL/L
CREAT SERPL-MCNC: 0.85 MG/DL
GFR CALCULATED: NORMAL
GLUCOSE BLD-MCNC: 88 MG/DL
POTASSIUM SERPL-SCNC: 4.4 MMOL/L
SODIUM BLD-SCNC: 143 MMOL/L
TOTAL PROTEIN: 7.1

## 2018-11-07 DIAGNOSIS — F48.9 NONPSYCHOTIC MENTAL DISORDER: Primary | ICD-10-CM

## 2018-11-07 RX ORDER — LORAZEPAM 1 MG/1
TABLET ORAL
Qty: 2 TABLET | Refills: 0 | Status: SHIPPED | OUTPATIENT
Start: 2018-11-07 | End: 2018-11-27

## 2018-11-27 ENCOUNTER — OFFICE VISIT (OUTPATIENT)
Dept: FAMILY MEDICINE CLINIC | Age: 68
End: 2018-11-27
Payer: MEDICARE

## 2018-11-27 VITALS
WEIGHT: 147.7 LBS | SYSTOLIC BLOOD PRESSURE: 132 MMHG | DIASTOLIC BLOOD PRESSURE: 67 MMHG | HEART RATE: 60 BPM | RESPIRATION RATE: 16 BRPM | BODY MASS INDEX: 23.74 KG/M2 | HEIGHT: 66 IN | OXYGEN SATURATION: 97 %

## 2018-11-27 DIAGNOSIS — R63.4 WEIGHT LOSS, UNINTENTIONAL: ICD-10-CM

## 2018-11-27 DIAGNOSIS — I10 ESSENTIAL HYPERTENSION: ICD-10-CM

## 2018-11-27 DIAGNOSIS — F41.9 ANXIETY: ICD-10-CM

## 2018-11-27 DIAGNOSIS — F48.9 NONPSYCHOTIC MENTAL DISORDER: ICD-10-CM

## 2018-11-27 DIAGNOSIS — E78.5 HYPERLIPIDEMIA, UNSPECIFIED HYPERLIPIDEMIA TYPE: ICD-10-CM

## 2018-11-27 DIAGNOSIS — L65.9 HAIR LOSS: ICD-10-CM

## 2018-11-27 DIAGNOSIS — F31.9 BIPOLAR 1 DISORDER (HCC): Primary | ICD-10-CM

## 2018-11-27 DIAGNOSIS — K59.00 CONSTIPATION, UNSPECIFIED CONSTIPATION TYPE: ICD-10-CM

## 2018-11-27 PROCEDURE — 3017F COLORECTAL CA SCREEN DOC REV: CPT | Performed by: FAMILY MEDICINE

## 2018-11-27 PROCEDURE — 1101F PT FALLS ASSESS-DOCD LE1/YR: CPT | Performed by: FAMILY MEDICINE

## 2018-11-27 PROCEDURE — G8399 PT W/DXA RESULTS DOCUMENT: HCPCS | Performed by: FAMILY MEDICINE

## 2018-11-27 PROCEDURE — 1123F ACP DISCUSS/DSCN MKR DOCD: CPT | Performed by: FAMILY MEDICINE

## 2018-11-27 PROCEDURE — 99214 OFFICE O/P EST MOD 30 MIN: CPT | Performed by: FAMILY MEDICINE

## 2018-11-27 PROCEDURE — G8484 FLU IMMUNIZE NO ADMIN: HCPCS | Performed by: FAMILY MEDICINE

## 2018-11-27 PROCEDURE — 1090F PRES/ABSN URINE INCON ASSESS: CPT | Performed by: FAMILY MEDICINE

## 2018-11-27 PROCEDURE — G8427 DOCREV CUR MEDS BY ELIG CLIN: HCPCS | Performed by: FAMILY MEDICINE

## 2018-11-27 PROCEDURE — 4040F PNEUMOC VAC/ADMIN/RCVD: CPT | Performed by: FAMILY MEDICINE

## 2018-11-27 PROCEDURE — 1036F TOBACCO NON-USER: CPT | Performed by: FAMILY MEDICINE

## 2018-11-27 PROCEDURE — G8420 CALC BMI NORM PARAMETERS: HCPCS | Performed by: FAMILY MEDICINE

## 2018-11-27 RX ORDER — LORAZEPAM 1 MG/1
TABLET ORAL
Qty: 4 TABLET | Refills: 0 | Status: SHIPPED | OUTPATIENT
Start: 2018-11-27 | End: 2018-12-31

## 2018-11-27 RX ORDER — LACTOSE-REDUCED FOOD
LIQUID (ML) ORAL
Qty: 60 CAN | Refills: 11 | Status: SHIPPED | OUTPATIENT
Start: 2018-11-27

## 2018-11-27 ASSESSMENT — ENCOUNTER SYMPTOMS
EYE PAIN: 0
SHORTNESS OF BREATH: 0
BLOOD IN STOOL: 0
BACK PAIN: 0

## 2018-11-27 NOTE — PROGRESS NOTES
(ANUSOL-HC) 2.5 % rectal cream, Place rectally 2 times daily Place rectally 2 times daily. , Disp: , Rfl:     Dextromethorphan-guaiFENesin  MG/5ML SYRP, Take 5 mLs by mouth every 4 hours as needed for Cough, Disp: , Rfl:     traZODone (DESYREL) 100 MG tablet, Take 100 mg by mouth nightly, Disp: , Rfl:     diphenhydrAMINE (BENADRYL) 25 MG capsule, Take 25 mg by mouth every 6 hours as needed for Itching or Allergies, Disp: , Rfl:     ibuprofen (ADVIL;MOTRIN) 200 MG tablet, Take 200 mg by mouth every 6 hours as needed for Pain, Disp: , Rfl:     magnesium hydroxide (MILK OF MAGNESIA) 400 MG/5ML suspension, Take 30 mLs by mouth daily as needed for Constipation, Disp: , Rfl:     guaiFENesin (MUCINEX) 600 MG extended release tablet, Take 600 mg by mouth 2 times daily as needed for Congestion, Disp: , Rfl:     Saccharomyces boulardii (PROBIOTIC) 250 MG CAPS, Take by mouth, Disp: , Rfl:     famotidine (PEPCID) 20 MG tablet, Take 20 mg by mouth 2 times daily, Disp: , Rfl:     ipratropium (ATROVENT) 0.03 % nasal spray, 2 sprays by Nasal route 2 times daily, Disp: , Rfl:     lactulose (CHRONULAC) 10 GM/15ML solution, Take 20 g by mouth nightly, Disp: , Rfl:     Multiple Vitamins-Minerals (MULTIVITAMIN PO), Take by mouth daily, Disp: , Rfl:     polyethylene glycol (GLYCOLAX) packet, Take 17 g by mouth daily as needed, Disp: , Rfl:     propranolol (INDERAL) 60 MG tablet, Take 60 mg by mouth 2 times daily 2 BID, Disp: , Rfl:     QUEtiapine (SEROQUEL) 300 MG tablet, Take 300 mg by mouth every morning , Disp: , Rfl:     QUEtiapine (SEROQUEL) 400 MG tablet, Take 400 mg by mouth daily, Disp: , Rfl:     senna (SENOKOT) 8.6 MG TABS tablet, Take 1 tablet by mouth 2 times daily, Disp: , Rfl:     trihexyphenidyl (ARTANE) 2 MG tablet, Take 2 mg by mouth nightly, Disp: , Rfl:     acetaminophen (TYLENOL) 160 MG/5ML liquid, Take 15 mg/kg by mouth every 4 hours as needed for Fever 20 ML Q 4 HRS, Disp: , Rfl:    acetaminophen (TYLENOL) 325 MG tablet, Take 650 mg by mouth every 4 hours as needed for Pain 2 Q 4 HRS, Disp: , Rfl:     bisacodyl (DULCOLAX) 5 MG EC tablet, Take 5 mg by mouth as needed for Constipation 1 EVERY 2 DAYS AS NEEDED, Disp: , Rfl:     shark liver oil-cocoa butter (HEMORRHOID) 0.25-3-85.5 % suppository, Place 1 suppository rectally as needed for Hemorrhoids, Disp: , Rfl:     Tetrahydrozoline HCl (EYE DROPS OP), Apply to eye, Disp: , Rfl:     BACITRACIN EX, Apply topically, Disp: , Rfl:     Subjective:     Review of Systems   Constitutional: Positive for unexpected weight change. Negative for appetite change, diaphoresis and fever. HENT: Negative for ear pain. Eyes: Negative for pain. Respiratory: Negative for shortness of breath. Cardiovascular: Negative for chest pain and leg swelling. Gastrointestinal: Negative for blood in stool. Musculoskeletal: Negative for back pain and gait problem. Skin: Negative for pallor. Neurological: Negative for seizures. Psychiatric/Behavioral: Negative for dysphoric mood and suicidal ideas. Objective:     /67   Pulse 60   Resp 16   Ht 5' 5.98\" (1.676 m)   Wt 147 lb 11.2 oz (67 kg)   SpO2 97%   BMI 23.85 kg/m²     Physical Exam   Constitutional: She appears well-developed. No distress. HENT:   Head: Normocephalic. Eyes: Conjunctivae and EOM are normal.   Neck: Normal range of motion. Neck supple. No thyromegaly present. Cardiovascular: Normal heart sounds. No murmur heard. Pulmonary/Chest: Effort normal and breath sounds normal. No respiratory distress. She has no wheezes. She has no rales. She exhibits no tenderness. Abdominal: Soft. She exhibits no distension and no mass. There is no tenderness. There is no rebound and no guarding. Musculoskeletal: She exhibits no edema or deformity. No pain on palpation down the back   Lymphadenopathy:     She has no cervical adenopathy. Neurological: She is alert.    Skin: Skin

## 2018-11-28 LAB
ALBUMIN SERPL-MCNC: 3.45 G/DL
ALP BLD-CCNC: 114 U/L
ALT SERPL-CCNC: 22 U/L
ANION GAP SERPL CALCULATED.3IONS-SCNC: NORMAL MMOL/L
AST SERPL-CCNC: 32 U/L
AVERAGE GLUCOSE: NORMAL
BASOPHILS ABSOLUTE: NORMAL /ΜL
BASOPHILS RELATIVE PERCENT: NORMAL %
BILIRUB SERPL-MCNC: 0.28 MG/DL (ref 0.1–1.4)
BUN BLDV-MCNC: 10 MG/DL
CALCIUM SERPL-MCNC: 9.1 MG/DL
CHLORIDE BLD-SCNC: 104 MMOL/L
CHOLESTEROL, TOTAL: 144 MG/DL
CHOLESTEROL/HDL RATIO: 3.4
CO2: 32 MMOL/L
CREAT SERPL-MCNC: 0.61 MG/DL
EOSINOPHILS ABSOLUTE: NORMAL /ΜL
EOSINOPHILS RELATIVE PERCENT: NORMAL %
FERRITIN: 306 NG/ML (ref 9–150)
GFR CALCULATED: NORMAL
GLUCOSE BLD-MCNC: 75 MG/DL
HBA1C MFR BLD: 5.2 %
HCT VFR BLD CALC: 36.5 % (ref 36–46)
HDLC SERPL-MCNC: 42 MG/DL (ref 35–70)
HEMOGLOBIN: 12 G/DL (ref 12–16)
LDL CHOLESTEROL CALCULATED: 81 MG/DL (ref 0–160)
LYMPHOCYTES ABSOLUTE: NORMAL /ΜL
LYMPHOCYTES RELATIVE PERCENT: NORMAL %
MCH RBC QN AUTO: 31 PG
MCHC RBC AUTO-ENTMCNC: 32.9 G/DL
MCV RBC AUTO: 94.2 FL
MONOCYTES ABSOLUTE: NORMAL /ΜL
MONOCYTES RELATIVE PERCENT: NORMAL %
NEUTROPHILS ABSOLUTE: NORMAL /ΜL
NEUTROPHILS RELATIVE PERCENT: NORMAL %
PDW BLD-RTO: NORMAL %
PLATELET # BLD: 324 K/ΜL
PMV BLD AUTO: NORMAL FL
POTASSIUM SERPL-SCNC: 4.7 MMOL/L
RBC # BLD: 3.87 10^6/ΜL
SODIUM BLD-SCNC: 141 MMOL/L
TOTAL PROTEIN: 7.2
TRIGL SERPL-MCNC: 104 MG/DL
VITAMIN B-12: 866
VLDLC SERPL CALC-MCNC: 21 MG/DL
WBC # BLD: 4.8 10^3/ML

## 2018-12-10 ENCOUNTER — TELEPHONE (OUTPATIENT)
Dept: FAMILY MEDICINE CLINIC | Age: 68
End: 2018-12-10

## 2018-12-10 ENCOUNTER — HOSPITAL ENCOUNTER (OUTPATIENT)
Dept: CT IMAGING | Age: 68
Discharge: HOME | End: 2018-12-12
Payer: MEDICARE

## 2018-12-10 DIAGNOSIS — R63.4 WEIGHT LOSS, UNINTENTIONAL: Primary | ICD-10-CM

## 2018-12-10 DIAGNOSIS — R63.4 WEIGHT LOSS, UNINTENTIONAL: ICD-10-CM

## 2018-12-10 DIAGNOSIS — R63.4 LOSS OF WEIGHT: ICD-10-CM

## 2018-12-10 PROCEDURE — 74176 CT ABD & PELVIS W/O CONTRAST: CPT

## 2018-12-10 PROCEDURE — 71250 CT THORAX DX C-: CPT

## 2018-12-10 RX ORDER — SODIUM CHLORIDE 0.9 % (FLUSH) 0.9 %
10 SYRINGE (ML) INJECTION PRN
Status: DISCONTINUED | OUTPATIENT
Start: 2018-12-10 | End: 2018-12-13 | Stop reason: HOSPADM

## 2018-12-10 RX ORDER — 0.9 % SODIUM CHLORIDE 0.9 %
80 INTRAVENOUS SOLUTION INTRAVENOUS ONCE
Status: DISCONTINUED | OUTPATIENT
Start: 2018-12-10 | End: 2018-12-13 | Stop reason: HOSPADM

## 2018-12-11 DIAGNOSIS — R94.5 ABNORMAL RESULTS OF LIVER FUNCTION STUDIES: ICD-10-CM

## 2018-12-11 DIAGNOSIS — F31.9 BIPOLAR 1 DISORDER (HCC): ICD-10-CM

## 2018-12-11 DIAGNOSIS — E55.9 VITAMIN D DEFICIENCY: ICD-10-CM

## 2018-12-11 DIAGNOSIS — R79.89 ELEVATED FERRITIN: Primary | ICD-10-CM

## 2018-12-11 DIAGNOSIS — I10 ESSENTIAL HYPERTENSION: ICD-10-CM

## 2018-12-11 DIAGNOSIS — E78.5 HYPERLIPIDEMIA, UNSPECIFIED HYPERLIPIDEMIA TYPE: ICD-10-CM

## 2018-12-11 DIAGNOSIS — L65.9 HAIR LOSS: ICD-10-CM

## 2018-12-11 DIAGNOSIS — R74.8 ALKALINE PHOSPHATASE ELEVATION: ICD-10-CM

## 2018-12-13 DIAGNOSIS — L89.309 PRESSURE INJURY OF SKIN OF BUTTOCK, UNSPECIFIED INJURY STAGE, UNSPECIFIED LATERALITY: Primary | ICD-10-CM

## 2018-12-18 ENCOUNTER — HOSPITAL ENCOUNTER (OUTPATIENT)
Dept: MRI IMAGING | Age: 68
Discharge: HOME | End: 2018-12-20
Payer: MEDICARE

## 2018-12-18 DIAGNOSIS — K76.9 LIVER LESION: ICD-10-CM

## 2018-12-18 DIAGNOSIS — R63.4 WEIGHT LOSS, UNINTENTIONAL: ICD-10-CM

## 2018-12-18 PROCEDURE — 74183 MRI ABD W/O CNTR FLWD CNTR: CPT

## 2018-12-18 PROCEDURE — A9579 GAD-BASE MR CONTRAST NOS,1ML: HCPCS | Performed by: FAMILY MEDICINE

## 2018-12-18 PROCEDURE — 2500000003 HC RX 250 WO HCPCS: Performed by: FAMILY MEDICINE

## 2018-12-18 PROCEDURE — 2580000003 HC RX 258: Performed by: FAMILY MEDICINE

## 2018-12-18 PROCEDURE — 6360000004 HC RX CONTRAST MEDICATION: Performed by: FAMILY MEDICINE

## 2018-12-18 RX ORDER — BACTERIOSTATIC SODIUM CHLORIDE 0.9 %
20 VIAL (ML) INJECTION
Status: COMPLETED | OUTPATIENT
Start: 2018-12-18 | End: 2018-12-18

## 2018-12-18 RX ORDER — SODIUM CHLORIDE 0.9 % (FLUSH) 0.9 %
10 SYRINGE (ML) INJECTION
Status: COMPLETED | OUTPATIENT
Start: 2018-12-18 | End: 2018-12-18

## 2018-12-18 RX ADMIN — GADOTERIDOL 14 ML: 279.3 INJECTION, SOLUTION INTRAVENOUS at 09:40

## 2018-12-18 RX ADMIN — Medication 10 ML: at 09:55

## 2018-12-18 RX ADMIN — SODIUM CHLORIDE 20 ML: 9 INJECTION, SOLUTION INTRAMUSCULAR; INTRAVENOUS; SUBCUTANEOUS at 09:54

## 2019-01-16 ENCOUNTER — HOSPITAL ENCOUNTER (OUTPATIENT)
Dept: WOUND CARE | Age: 69
Discharge: HOME | End: 2019-01-16
Payer: MEDICARE

## 2019-01-16 VITALS
DIASTOLIC BLOOD PRESSURE: 72 MMHG | TEMPERATURE: 97.5 F | SYSTOLIC BLOOD PRESSURE: 152 MMHG | RESPIRATION RATE: 18 BRPM | HEART RATE: 53 BPM

## 2019-01-16 DIAGNOSIS — L91.0 HYPERTROPHIC SCAR OF SKIN: ICD-10-CM

## 2019-01-16 PROCEDURE — 99213 OFFICE O/P EST LOW 20 MIN: CPT

## 2019-02-04 ENCOUNTER — OFFICE VISIT (OUTPATIENT)
Dept: FAMILY MEDICINE CLINIC | Age: 69
End: 2019-02-04
Payer: MEDICARE

## 2019-02-04 VITALS
BODY MASS INDEX: 23.98 KG/M2 | WEIGHT: 149.2 LBS | DIASTOLIC BLOOD PRESSURE: 70 MMHG | OXYGEN SATURATION: 97 % | HEIGHT: 66 IN | SYSTOLIC BLOOD PRESSURE: 129 MMHG | RESPIRATION RATE: 16 BRPM | HEART RATE: 64 BPM

## 2019-02-04 DIAGNOSIS — R63.4 WEIGHT LOSS, UNINTENTIONAL: Primary | ICD-10-CM

## 2019-02-04 DIAGNOSIS — S22.42XA CLOSED FRACTURE OF MULTIPLE RIBS OF LEFT SIDE, INITIAL ENCOUNTER: ICD-10-CM

## 2019-02-04 DIAGNOSIS — L89.309 PRESSURE INJURY OF SKIN OF BUTTOCK, UNSPECIFIED INJURY STAGE, UNSPECIFIED LATERALITY: ICD-10-CM

## 2019-02-04 DIAGNOSIS — F31.9 BIPOLAR 1 DISORDER (HCC): ICD-10-CM

## 2019-02-04 PROCEDURE — G8427 DOCREV CUR MEDS BY ELIG CLIN: HCPCS | Performed by: FAMILY MEDICINE

## 2019-02-04 PROCEDURE — 3017F COLORECTAL CA SCREEN DOC REV: CPT | Performed by: FAMILY MEDICINE

## 2019-02-04 PROCEDURE — 99213 OFFICE O/P EST LOW 20 MIN: CPT | Performed by: FAMILY MEDICINE

## 2019-02-04 PROCEDURE — 1090F PRES/ABSN URINE INCON ASSESS: CPT | Performed by: FAMILY MEDICINE

## 2019-02-04 PROCEDURE — 4040F PNEUMOC VAC/ADMIN/RCVD: CPT | Performed by: FAMILY MEDICINE

## 2019-02-04 PROCEDURE — G8420 CALC BMI NORM PARAMETERS: HCPCS | Performed by: FAMILY MEDICINE

## 2019-02-04 PROCEDURE — 1036F TOBACCO NON-USER: CPT | Performed by: FAMILY MEDICINE

## 2019-02-04 PROCEDURE — G8484 FLU IMMUNIZE NO ADMIN: HCPCS | Performed by: FAMILY MEDICINE

## 2019-02-04 PROCEDURE — 1101F PT FALLS ASSESS-DOCD LE1/YR: CPT | Performed by: FAMILY MEDICINE

## 2019-02-04 PROCEDURE — G8399 PT W/DXA RESULTS DOCUMENT: HCPCS | Performed by: FAMILY MEDICINE

## 2019-02-04 PROCEDURE — 1123F ACP DISCUSS/DSCN MKR DOCD: CPT | Performed by: FAMILY MEDICINE

## 2019-02-04 RX ORDER — LORAZEPAM 1 MG/1
1 TABLET ORAL EVERY 6 HOURS PRN
COMMUNITY
End: 2020-07-21 | Stop reason: ALTCHOICE

## 2019-02-04 ASSESSMENT — ENCOUNTER SYMPTOMS: SHORTNESS OF BREATH: 0

## 2019-02-06 LAB
A/G RATIO: NORMAL
ALBUMIN SERPL-MCNC: 3.11 G/DL
ALP BLD-CCNC: 166 U/L
ALT SERPL-CCNC: 80 U/L
AST SERPL-CCNC: 79 U/L
BILIRUB SERPL-MCNC: 0.2 MG/DL (ref 0.1–1.4)
BILIRUBIN DIRECT: 0.06 MG/DL
BILIRUBIN, INDIRECT: NORMAL
GLOBULIN: 3.6
IRON: 44
PROTEIN TOTAL: 6.7 G/DL
TOTAL IRON BINDING CAPACITY: 264

## 2019-02-12 DIAGNOSIS — E55.9 VITAMIN D DEFICIENCY: ICD-10-CM

## 2019-02-12 DIAGNOSIS — R74.8 ALKALINE PHOSPHATASE ELEVATION: ICD-10-CM

## 2019-02-12 DIAGNOSIS — R74.9 ABNORMAL SERUM ENZYME LEVEL: ICD-10-CM

## 2019-02-12 DIAGNOSIS — R79.89 ELEVATED FERRITIN: ICD-10-CM

## 2019-02-12 DIAGNOSIS — R79.89 ELEVATED LFTS: ICD-10-CM

## 2019-02-12 DIAGNOSIS — R63.4 WEIGHT LOSS, UNINTENTIONAL: Primary | ICD-10-CM

## 2019-02-24 ENCOUNTER — HOSPITAL ENCOUNTER (OUTPATIENT)
Facility: CLINIC | Age: 69
Discharge: HOME | End: 2019-02-24
Payer: MEDICARE

## 2019-02-24 DIAGNOSIS — R74.9 ABNORMAL SERUM ENZYME LEVEL: ICD-10-CM

## 2019-02-24 DIAGNOSIS — R79.89 ELEVATED LFTS: ICD-10-CM

## 2019-02-24 DIAGNOSIS — R63.4 WEIGHT LOSS, UNINTENTIONAL: ICD-10-CM

## 2019-04-01 ENCOUNTER — OFFICE VISIT (OUTPATIENT)
Dept: FAMILY MEDICINE CLINIC | Age: 69
End: 2019-04-01
Payer: MEDICARE

## 2019-04-01 VITALS
OXYGEN SATURATION: 99 % | BODY MASS INDEX: 24.09 KG/M2 | HEART RATE: 65 BPM | HEIGHT: 66 IN | RESPIRATION RATE: 16 BRPM | SYSTOLIC BLOOD PRESSURE: 129 MMHG | DIASTOLIC BLOOD PRESSURE: 89 MMHG

## 2019-04-01 DIAGNOSIS — R63.4 WEIGHT DECREASED: Primary | ICD-10-CM

## 2019-04-01 DIAGNOSIS — R79.89 LFT ELEVATION: ICD-10-CM

## 2019-04-01 PROCEDURE — 4040F PNEUMOC VAC/ADMIN/RCVD: CPT | Performed by: FAMILY MEDICINE

## 2019-04-01 PROCEDURE — 3017F COLORECTAL CA SCREEN DOC REV: CPT | Performed by: FAMILY MEDICINE

## 2019-04-01 PROCEDURE — G8420 CALC BMI NORM PARAMETERS: HCPCS | Performed by: FAMILY MEDICINE

## 2019-04-01 PROCEDURE — 1123F ACP DISCUSS/DSCN MKR DOCD: CPT | Performed by: FAMILY MEDICINE

## 2019-04-01 PROCEDURE — 1036F TOBACCO NON-USER: CPT | Performed by: FAMILY MEDICINE

## 2019-04-01 PROCEDURE — G8399 PT W/DXA RESULTS DOCUMENT: HCPCS | Performed by: FAMILY MEDICINE

## 2019-04-01 PROCEDURE — 1090F PRES/ABSN URINE INCON ASSESS: CPT | Performed by: FAMILY MEDICINE

## 2019-04-01 PROCEDURE — 99213 OFFICE O/P EST LOW 20 MIN: CPT | Performed by: FAMILY MEDICINE

## 2019-04-01 PROCEDURE — G8427 DOCREV CUR MEDS BY ELIG CLIN: HCPCS | Performed by: FAMILY MEDICINE

## 2019-04-01 ASSESSMENT — ENCOUNTER SYMPTOMS
NAUSEA: 0
CONSTIPATION: 0
SHORTNESS OF BREATH: 0
VOMITING: 0
DIARRHEA: 0
BLOOD IN STOOL: 0
ANAL BLEEDING: 0

## 2019-04-01 NOTE — PROGRESS NOTES
Laterality Date    ABDOMEN SURGERY      pyloric ulcer surgery    COLONOSCOPY  12/13/2017    attempted-poor prep    COLONOSCOPY  02/08/2018    suboptimal prep; redundant colon; small internal hemorrhoids    MD COLON CA SCRN NOT HI RSK IND N/A 12/13/2017    COLONOSCOPY, ATTEMPTED / POOR -PREP performed by Kodi Sidhu MD at [address] IND N/A 2/8/2018    COLONOSCOPY performed by Kodi Sidhu MD at [facility]     No family history on file. Social History     Tobacco Use    Smoking status: Never Smoker    Smokeless tobacco: Never Used   Substance Use Topics    Alcohol use: No     Alcohol/week: 0.0 oz    Drug use: No       Current Outpatient Medications:     LORazepam (ATIVAN) 1 MG tablet, Take 1 mg by mouth every 6 hours as needed for Anxiety. ., Disp: , Rfl:     Nutritional Supplements (BOOST) LIQD, 2 bottles/cans a day, Disp: 60 Can, Rfl: 11    atorvastatin (LIPITOR) 40 MG tablet, Take 1 tablet by mouth daily, Disp: 90 tablet, Rfl: 3    amLODIPine (NORVASC) 5 MG tablet, Take 1 tablet by mouth daily, Disp: 30 tablet, Rfl: 3    valproate (DEPAKENE) 250 MG/5ML solution, Take 25 mLs by mouth nightly, Disp: 1 Bottle, Rfl: 0    Sodium Phosphates (FLEET) 7-19 GM/118ML, Place 1 enema rectally once as needed, Disp: , Rfl:     hydrocortisone (ANUSOL-HC) 2.5 % rectal cream, Place rectally 2 times daily Place rectally 2 times daily. , Disp: , Rfl:     Dextromethorphan-guaiFENesin  MG/5ML SYRP, Take 5 mLs by mouth every 4 hours as needed for Cough, Disp: , Rfl:     traZODone (DESYREL) 100 MG tablet, Take 100 mg by mouth nightly, Disp: , Rfl:     diphenhydrAMINE (BENADRYL) 25 MG capsule, Take 25 mg by mouth every 6 hours as needed for Itching or Allergies, Disp: , Rfl:     ibuprofen (ADVIL;MOTRIN) 200 MG tablet, Take 200 mg by mouth every 6 hours as needed for Pain, Disp: , Rfl:     magnesium hydroxide (MILK OF MAGNESIA) 400 MG/5ML suspension, Take 30 mLs by mouth daily as needed for Constipation, Disp: , Rfl:     guaiFENesin (MUCINEX) 600 MG extended release tablet, Take 600 mg by mouth 2 times daily as needed for Congestion, Disp: , Rfl:     Saccharomyces boulardii (PROBIOTIC) 250 MG CAPS, Take by mouth, Disp: , Rfl:     famotidine (PEPCID) 20 MG tablet, Take 20 mg by mouth 2 times daily, Disp: , Rfl:     ipratropium (ATROVENT) 0.03 % nasal spray, 2 sprays by Nasal route 2 times daily, Disp: , Rfl:     lactulose (CHRONULAC) 10 GM/15ML solution, Take 20 g by mouth nightly, Disp: , Rfl:     Multiple Vitamins-Minerals (MULTIVITAMIN PO), Take by mouth daily, Disp: , Rfl:     polyethylene glycol (GLYCOLAX) packet, Take 17 g by mouth daily as needed, Disp: , Rfl:     propranolol (INDERAL) 60 MG tablet, Take 60 mg by mouth 2 times daily 2 BID, Disp: , Rfl:     QUEtiapine (SEROQUEL) 300 MG tablet, Take 300 mg by mouth every morning , Disp: , Rfl:     QUEtiapine (SEROQUEL) 400 MG tablet, Take 400 mg by mouth daily, Disp: , Rfl:     senna (SENOKOT) 8.6 MG TABS tablet, Take 1 tablet by mouth 2 times daily, Disp: , Rfl:     trihexyphenidyl (ARTANE) 2 MG tablet, Take 2 mg by mouth nightly, Disp: , Rfl:     acetaminophen (TYLENOL) 160 MG/5ML liquid, Take 15 mg/kg by mouth every 4 hours as needed for Fever 20 ML Q 4 HRS, Disp: , Rfl:     acetaminophen (TYLENOL) 325 MG tablet, Take 650 mg by mouth every 4 hours as needed for Pain 2 Q 4 HRS, Disp: , Rfl:     bisacodyl (DULCOLAX) 5 MG EC tablet, Take 5 mg by mouth as needed for Constipation 1 EVERY 2 DAYS AS NEEDED, Disp: , Rfl:     shark liver oil-cocoa butter (HEMORRHOID) 0.25-3-85.5 % suppository, Place 1 suppository rectally as needed for Hemorrhoids, Disp: , Rfl:     Tetrahydrozoline HCl (EYE DROPS OP), Apply to eye, Disp: , Rfl:     BACITRACIN EX, Apply topically, Disp: , Rfl:     Subjective:     Review of Systems   Constitutional: Negative for appetite change, diaphoresis and fever.    Respiratory: Negative for shortness of breath. Cardiovascular: Negative for chest pain. Gastrointestinal: Negative for anal bleeding, blood in stool, constipation, diarrhea, nausea and vomiting. Objective:     /89   Pulse 65   Resp 16   Ht 5' 5.98\" (1.676 m)   SpO2 99%   BMI 24.09 kg/m²     Physical Exam   Constitutional: She appears well-developed. HENT:   Head: Normocephalic. Eyes: Conjunctivae and EOM are normal.   Cardiovascular: Normal heart sounds. No murmur heard. Pulmonary/Chest: Effort normal and breath sounds normal. No respiratory distress. She has no wheezes. Neurological: She is alert. Skin: She is not diaphoretic. Psychiatric:   MRDD, nonconversant, seated pleasantly in chair murmuring to herself, did not respond to questions when asked       Assessment & Plan:      1. LFT elevation  Needs repeat LFTs, order printed  - Hepatic Function Panel; Future    2. Weight decreased  Continue with supplement shakes, weight progressively increasing, follow-up in 3 months    Call or return to clinic prn if these symptoms worsen or fail to improve as anticipated. I have reviewed the instructions with the patient, answering all questions to their satisfaction.     Electronically signed by Lu Dakin, MD on 4/1/2019 at 2:25 PM

## 2019-06-05 ENCOUNTER — OFFICE VISIT (OUTPATIENT)
Dept: FAMILY MEDICINE CLINIC | Age: 69
End: 2019-06-05
Payer: MEDICARE

## 2019-06-05 VITALS
HEART RATE: 55 BPM | RESPIRATION RATE: 16 BRPM | SYSTOLIC BLOOD PRESSURE: 135 MMHG | DIASTOLIC BLOOD PRESSURE: 75 MMHG | OXYGEN SATURATION: 95 %

## 2019-06-05 DIAGNOSIS — Z12.39 SCREENING FOR BREAST CANCER: ICD-10-CM

## 2019-06-05 DIAGNOSIS — M89.9 RIB LESION: Primary | ICD-10-CM

## 2019-06-05 PROCEDURE — 3288F FALL RISK ASSESSMENT DOCD: CPT | Performed by: FAMILY MEDICINE

## 2019-06-05 PROCEDURE — 3017F COLORECTAL CA SCREEN DOC REV: CPT | Performed by: FAMILY MEDICINE

## 2019-06-05 PROCEDURE — 4040F PNEUMOC VAC/ADMIN/RCVD: CPT | Performed by: FAMILY MEDICINE

## 2019-06-05 PROCEDURE — 99213 OFFICE O/P EST LOW 20 MIN: CPT | Performed by: FAMILY MEDICINE

## 2019-06-05 PROCEDURE — G8399 PT W/DXA RESULTS DOCUMENT: HCPCS | Performed by: FAMILY MEDICINE

## 2019-06-05 PROCEDURE — 1036F TOBACCO NON-USER: CPT | Performed by: FAMILY MEDICINE

## 2019-06-05 PROCEDURE — G8427 DOCREV CUR MEDS BY ELIG CLIN: HCPCS | Performed by: FAMILY MEDICINE

## 2019-06-05 PROCEDURE — G8420 CALC BMI NORM PARAMETERS: HCPCS | Performed by: FAMILY MEDICINE

## 2019-06-05 PROCEDURE — 1123F ACP DISCUSS/DSCN MKR DOCD: CPT | Performed by: FAMILY MEDICINE

## 2019-06-05 PROCEDURE — 1090F PRES/ABSN URINE INCON ASSESS: CPT | Performed by: FAMILY MEDICINE

## 2019-06-05 ASSESSMENT — ENCOUNTER SYMPTOMS: SHORTNESS OF BREATH: 0

## 2019-06-05 NOTE — PROGRESS NOTES
[facility]  FAMILY MEDICINE  [address] [place] [id number]  Dept: [phone]    Stephanie Jordan is a 77 y.o. female who presents today for hermedical conditions/complaints as noted below. Stephanie Jordan is here today c/o Rib Injury       HPI:     HPI    Staff note bulging hard area at the left ribs that protrudes when lying - noted on Sunday  She does not seem bothered by this, not showing signs of being in pain or distress  No bulging at the back or at the flanks, urinating okay and having normal bowel movements daily  No recent trauma or injury  She does have a history of fractures along the lateral arches of left ribs 6, 7, 8 and 9, measures instituted at the group home to help prevent future fractures   Recently started going through cycling behavior again, she is talking fast and seems agitated  Medications have not been adjusted by psychiatry  Eating and drinking well, enjoys the boost supplement shake    Wt Readings from Last 3 Encounters:   02/04/19 149 lb 3.2 oz (67.7 kg)   11/27/18 147 lb 11.2 oz (67 kg)   08/23/18 168 lb (76.2 kg)       Patient Active Problem List   Diagnosis    Hyperlipidemia    Constipation    Anxiety    Bipolar 1 disorder ([address]    Nonpsychotic mental disorder     Hematuria, cystoscopy normal    PTSD (post-traumatic stress disorder)    Essential hypertension    Weight loss, unintentional    Hypertrophic scar of skin    Multiple closed fractures of ribs of left side    Elevated LFTs       Past Medical History:   Diagnosis Date    Allergic rhinitis     Bipolar affective ([address]     Constipation     Contracture of muscle of multiple sites     bilateral upper extremities.     Dermatomyositis ([address]     Dysphagia     Hypercholesterolemia     Hypertension     Mental retardation, profound (I.Q. < 20)     Neuroleptic-induced tardive dyskinesia     PTSD (post-traumatic stress disorder)     Pyloric ulcer     w stenosis    Schizophrenia [facility])      Past Surgical History:   Procedure Laterality Date    ABDOMEN SURGERY      pyloric ulcer surgery    COLONOSCOPY  12/13/2017    attempted-poor prep    COLONOSCOPY  02/08/2018    suboptimal prep; redundant colon; small internal hemorrhoids    ID COLON CA SCRN NOT HI RSK IND N/A 12/13/2017    COLONOSCOPY, ATTEMPTED / POOR -PREP performed by Wendi Blount MD at [address] [place] [address] NOT  [address] IND N/A 2/8/2018    COLON[facility] performed by [facility] at [facility]     No family history on file. Social History     Tobacco Use    Smoking status: Never Smoker    Smokeless tobacco: Never Used   Substance Use Topics    Alcohol use: No     Alcohol/week: 0.0 oz    Drug use: No       Current Outpatient Medications:     LORazepam (ATIVAN) 1 MG tablet, Take 1 mg by mouth every 6 hours as needed for Anxiety. ., Disp: , Rfl:     Nutritional Supplements (BOOST) LIQD, 2 bottles/cans a day, Disp: 60 Can, Rfl: 11    atorvastatin (LIPITOR) 40 MG tablet, Take 1 tablet by mouth daily, Disp: 90 tablet, Rfl: 3    amLODIPine (NORVASC) 5 MG tablet, Take 1 tablet by mouth daily, Disp: 30 tablet, Rfl: 3    valproate (DEPAKENE) 250 MG/5ML solution, Take 25 mLs by mouth nightly, Disp: 1 Bottle, Rfl: 0    Sodium Phosphates (FLEET) 7-19 GM/118ML, Place 1 enema rectally once as needed, Disp: , Rfl:     hydrocortisone (ANUSOL-HC) 2.5 % rectal cream, Place rectally 2 times daily Place rectally 2 times daily. , Disp: , Rfl:     Dextromethorphan-guaiFENesin  MG/5ML SYRP, Take 5 mLs by mouth every 4 hours as needed for Cough, Disp: , Rfl:     traZODone (DESYREL) 100 MG tablet, Take 100 mg by mouth nightly, Disp: , Rfl:     diphenhydrAMINE (BENADRYL) 25 MG capsule, Take 25 mg by mouth every 6 hours as needed for Itching or Allergies, Disp: , Rfl:     ibuprofen (ADVIL;MOTRIN) 200 MG tablet, Take 200 mg by mouth every 6 hours as needed for Pain, Disp: , Rfl:     magnesium fever.   Respiratory: Negative for shortness of breath. Cardiovascular: Negative for chest pain. Objective:     /75   Pulse 55   Resp 16   SpO2 95%     Physical Exam   Constitutional: She appears well-developed. HENT:   Head: Normocephalic. Eyes: Conjunctivae and EOM are normal.   Cardiovascular: Normal heart sounds. No murmur heard. Pulmonary/Chest: Effort normal and breath sounds normal. No respiratory distress. She has no wheezes. She exhibits no tenderness. No obvious lesions or lumps noted at the chest   Neurological: She is alert. Skin: She is not diaphoretic. Psychiatric:   Patient was muttering loudly with rapid speech, speech incomprehensible, talking in animated fashion       Assessment & Plan:      1. Rib lesion  Could be related to her healing rib fractures, x-ray of the ribs ordered, staff advised to monitor for signs of pain or distress  - XR RIBS LEFT INCLUDE CHEST (MIN 3 VIEWS); Future    2. Screening for breast cancer  - [facility] DIGITAL SCREEN W CAD BILATERAL; Future    She was unwilling to step out of the wheelchair for a weight check, follow-up in July for this    Call or return to clinic prn if these symptoms worsen or fail to improve as anticipated. I have reviewed the instructions with the patient, answering all questions to their satisfaction.     Electronically signed by Sergio Ivy MD on 6/5/2019 at 12:52 PM

## 2019-06-10 DIAGNOSIS — F48.9 NONPSYCHOTIC MENTAL DISORDER: Primary | ICD-10-CM

## 2019-06-10 RX ORDER — LORAZEPAM 1 MG/1
TABLET ORAL
Qty: 5 TABLET | Refills: 0 | Status: SHIPPED | OUTPATIENT
Start: 2019-06-10 | End: 2019-07-10

## 2019-07-01 ENCOUNTER — OFFICE VISIT (OUTPATIENT)
Dept: FAMILY MEDICINE CLINIC | Age: 69
End: 2019-07-01
Payer: MEDICARE

## 2019-07-01 VITALS
DIASTOLIC BLOOD PRESSURE: 73 MMHG | HEART RATE: 68 BPM | RESPIRATION RATE: 16 BRPM | SYSTOLIC BLOOD PRESSURE: 114 MMHG | OXYGEN SATURATION: 95 %

## 2019-07-01 DIAGNOSIS — Z23 NEED FOR SHINGLES VACCINE: ICD-10-CM

## 2019-07-01 DIAGNOSIS — R63.4 WEIGHT LOSS, UNINTENTIONAL: Primary | ICD-10-CM

## 2019-07-01 DIAGNOSIS — E55.9 VITAMIN D DEFICIENCY: ICD-10-CM

## 2019-07-01 PROBLEM — R79.89 ELEVATED LFTS: Status: RESOLVED | Noted: 2019-02-12 | Resolved: 2019-07-01

## 2019-07-01 PROCEDURE — 99213 OFFICE O/P EST LOW 20 MIN: CPT | Performed by: FAMILY MEDICINE

## 2019-07-01 PROCEDURE — G8420 CALC BMI NORM PARAMETERS: HCPCS | Performed by: FAMILY MEDICINE

## 2019-07-01 PROCEDURE — G8427 DOCREV CUR MEDS BY ELIG CLIN: HCPCS | Performed by: FAMILY MEDICINE

## 2019-07-01 PROCEDURE — 1036F TOBACCO NON-USER: CPT | Performed by: FAMILY MEDICINE

## 2019-07-01 PROCEDURE — 1090F PRES/ABSN URINE INCON ASSESS: CPT | Performed by: FAMILY MEDICINE

## 2019-07-01 PROCEDURE — 1123F ACP DISCUSS/DSCN MKR DOCD: CPT | Performed by: FAMILY MEDICINE

## 2019-07-01 PROCEDURE — 3017F COLORECTAL CA SCREEN DOC REV: CPT | Performed by: FAMILY MEDICINE

## 2019-07-01 PROCEDURE — G8399 PT W/DXA RESULTS DOCUMENT: HCPCS | Performed by: FAMILY MEDICINE

## 2019-07-01 PROCEDURE — 4040F PNEUMOC VAC/ADMIN/RCVD: CPT | Performed by: FAMILY MEDICINE

## 2019-07-01 RX ORDER — CHOLECALCIFEROL (VITAMIN D3) 50 MCG
2000 TABLET ORAL DAILY
Qty: 90 TABLET | Refills: 3 | Status: SHIPPED | OUTPATIENT
Start: 2019-07-01

## 2019-07-01 ASSESSMENT — ENCOUNTER SYMPTOMS: SHORTNESS OF BREATH: 0

## 2019-07-01 NOTE — PROGRESS NOTES
[facility]  [address] [place] [id number]  Dept: [phone]    Payton Apodaca is a 77 y.o. female who presents today for hermedical conditions/complaints as noted below. Payton Apodaca is here today c/o 3 Month Follow-Up       HPI:     HPI    Seen today with Miri from the group home  She is starting to regain weight following the weight loss, weight loss thought to be because she has no appetite during her cycling behaviors  Has been having some more manic episodes, most recent was over this weekend, her psychiatrist is aware, no medication adjustments made  Continues to drink boost 4 times a day  Weight is progressively increasing at the group home    Elevated LFTs, hepatitis panel ordered, MRI of the liver showed hemangiomas  Recent labs from last month showed the LFTs have now normalized    Needs mammo, has appointment upcoming 7/8    Wt Readings from Last 3 Encounters:   02/04/19 149 lb 3.2 oz (67.7 kg)   11/27/18 147 lb 11.2 oz (67 kg)   08/23/18 168 lb (76.2 kg)       Patient Active Problem List   Diagnosis    Hyperlipidemia    Constipation    Anxiety    Bipolar 1 disorder ([address]    Nonpsychotic mental disorder     Hematuria, cystoscopy normal    PTSD (post-traumatic stress disorder)    Essential hypertension    Weight loss, unintentional    Hypertrophic scar of skin    Multiple closed fractures of ribs of left side       Past Medical History:   Diagnosis Date    Allergic rhinitis     Bipolar affective ([address]     Constipation     Contracture of muscle of multiple sites     bilateral upper extremities.     Dermatomyositis ([address]     Dysphagia     Hypercholesterolemia     Hypertension     Mental retardation, profound (I.Q. < 20)     Neuroleptic-induced tardive dyskinesia     PTSD (post-traumatic stress disorder)     Pyloric ulcer     w stenosis    Schizophrenia ([address]      Past Surgical History:

## 2019-07-24 ENCOUNTER — HOSPITAL ENCOUNTER (OUTPATIENT)
Dept: MAMMOGRAPHY | Age: 69
Discharge: HOME | End: 2019-07-26
Payer: MEDICARE

## 2019-07-24 DIAGNOSIS — F31.9 BIPOLAR 1 DISORDER (HCC): Primary | ICD-10-CM

## 2019-07-24 DIAGNOSIS — Z12.39 SCREENING FOR BREAST CANCER: ICD-10-CM

## 2019-07-24 PROCEDURE — 77063 BREAST TOMOSYNTHESIS BI: CPT

## 2019-08-12 DIAGNOSIS — F31.9 BIPOLAR 1 DISORDER (HCC): ICD-10-CM

## 2019-11-13 ENCOUNTER — OFFICE VISIT (OUTPATIENT)
Dept: FAMILY MEDICINE CLINIC | Age: 69
End: 2019-11-13
Payer: MEDICARE

## 2019-11-13 VITALS
DIASTOLIC BLOOD PRESSURE: 83 MMHG | BODY MASS INDEX: 25.35 KG/M2 | HEART RATE: 77 BPM | WEIGHT: 157 LBS | SYSTOLIC BLOOD PRESSURE: 120 MMHG | OXYGEN SATURATION: 92 %

## 2019-11-13 DIAGNOSIS — Z00.00 ROUTINE GENERAL MEDICAL EXAMINATION AT A HEALTH CARE FACILITY: Primary | ICD-10-CM

## 2019-11-13 DIAGNOSIS — D22.9 ENLARGED SKIN MOLE: ICD-10-CM

## 2019-11-13 DIAGNOSIS — R63.4 WEIGHT LOSS, UNINTENTIONAL: ICD-10-CM

## 2019-11-13 DIAGNOSIS — E78.5 HYPERLIPIDEMIA, UNSPECIFIED HYPERLIPIDEMIA TYPE: ICD-10-CM

## 2019-11-13 DIAGNOSIS — F31.9 BIPOLAR 1 DISORDER (HCC): ICD-10-CM

## 2019-11-13 PROCEDURE — 4040F PNEUMOC VAC/ADMIN/RCVD: CPT | Performed by: FAMILY MEDICINE

## 2019-11-13 PROCEDURE — G8484 FLU IMMUNIZE NO ADMIN: HCPCS | Performed by: FAMILY MEDICINE

## 2019-11-13 PROCEDURE — G0439 PPPS, SUBSEQ VISIT: HCPCS | Performed by: FAMILY MEDICINE

## 2019-11-13 PROCEDURE — 1123F ACP DISCUSS/DSCN MKR DOCD: CPT | Performed by: FAMILY MEDICINE

## 2019-11-13 PROCEDURE — 3017F COLORECTAL CA SCREEN DOC REV: CPT | Performed by: FAMILY MEDICINE

## 2019-11-13 ASSESSMENT — PATIENT HEALTH QUESTIONNAIRE - PHQ9
SUM OF ALL RESPONSES TO PHQ QUESTIONS 1-9: 0
2. FEELING DOWN, DEPRESSED OR HOPELESS: 0
1. LITTLE INTEREST OR PLEASURE IN DOING THINGS: 0
SUM OF ALL RESPONSES TO PHQ QUESTIONS 1-9: 0
SUM OF ALL RESPONSES TO PHQ QUESTIONS 1-9: 0
SUM OF ALL RESPONSES TO PHQ9 QUESTIONS 1 & 2: 0
SUM OF ALL RESPONSES TO PHQ QUESTIONS 1-9: 0

## 2019-11-13 ASSESSMENT — LIFESTYLE VARIABLES: HOW OFTEN DO YOU HAVE A DRINK CONTAINING ALCOHOL: 0

## 2019-11-27 LAB
BASOPHILS ABSOLUTE: 0 /ΜL
BASOPHILS RELATIVE PERCENT: 0.3 %
BUN BLDV-MCNC: 14 MG/DL
CALCIUM SERPL-MCNC: 8.5 MG/DL
CHLORIDE BLD-SCNC: 103 MMOL/L
CO2: 13 MMOL/L
CREAT SERPL-MCNC: 0.68 MG/DL
EOSINOPHILS ABSOLUTE: 0.2 /ΜL
EOSINOPHILS RELATIVE PERCENT: 3 %
GFR CALCULATED: NORMAL
GLUCOSE BLD-MCNC: 63 MG/DL
HCT VFR BLD CALC: 39.5 % (ref 36–46)
HEMOGLOBIN: 13 G/DL (ref 12–16)
LYMPHOCYTES ABSOLUTE: 2.6 /ΜL
LYMPHOCYTES RELATIVE PERCENT: 40.3 %
MCH RBC QN AUTO: 30.9 PG
MCHC RBC AUTO-ENTMCNC: 32.8 G/DL
MCV RBC AUTO: 94.2 FL
MONOCYTES ABSOLUTE: 0.5 /ΜL
MONOCYTES RELATIVE PERCENT: 7.8 %
NEUTROPHILS ABSOLUTE: 3.1 /ΜL
NEUTROPHILS RELATIVE PERCENT: 48.6 %
PDW BLD-RTO: 13 %
PLATELET # BLD: 251 K/ΜL
PMV BLD AUTO: 8.1 FL
POTASSIUM SERPL-SCNC: 4.6 MMOL/L
RBC # BLD: 4.19 10^6/ΜL
SODIUM BLD-SCNC: 140 MMOL/L
WBC # BLD: 6.4 10^3/ML

## 2019-11-28 LAB
CHOLESTEROL, TOTAL: 131 MG/DL
CHOLESTEROL/HDL RATIO: 2.9
HDLC SERPL-MCNC: 45 MG/DL (ref 35–70)
LDL CHOLESTEROL CALCULATED: 76 MG/DL (ref 0–160)
TRIGL SERPL-MCNC: 52 MG/DL
VLDLC SERPL CALC-MCNC: 10 MG/DL

## 2019-12-13 DIAGNOSIS — R63.4 WEIGHT LOSS, UNINTENTIONAL: ICD-10-CM

## 2019-12-13 DIAGNOSIS — Z00.00 ROUTINE GENERAL MEDICAL EXAMINATION AT A HEALTH CARE FACILITY: ICD-10-CM

## 2019-12-13 DIAGNOSIS — F31.9 BIPOLAR 1 DISORDER (HCC): ICD-10-CM

## 2019-12-13 DIAGNOSIS — E78.5 HYPERLIPIDEMIA, UNSPECIFIED HYPERLIPIDEMIA TYPE: ICD-10-CM

## 2019-12-16 ENCOUNTER — OFFICE VISIT (OUTPATIENT)
Dept: DERMATOLOGY | Age: 69
End: 2019-12-16
Payer: MEDICARE

## 2019-12-16 DIAGNOSIS — L82.1 SEBORRHEIC KERATOSES: Primary | ICD-10-CM

## 2019-12-16 DIAGNOSIS — L30.0 NUMMULAR DERMATITIS: ICD-10-CM

## 2019-12-16 PROCEDURE — G8417 CALC BMI ABV UP PARAM F/U: HCPCS | Performed by: DERMATOLOGY

## 2019-12-16 PROCEDURE — G8399 PT W/DXA RESULTS DOCUMENT: HCPCS | Performed by: DERMATOLOGY

## 2019-12-16 PROCEDURE — 1123F ACP DISCUSS/DSCN MKR DOCD: CPT | Performed by: DERMATOLOGY

## 2019-12-16 PROCEDURE — 1036F TOBACCO NON-USER: CPT | Performed by: DERMATOLOGY

## 2019-12-16 PROCEDURE — 3017F COLORECTAL CA SCREEN DOC REV: CPT | Performed by: DERMATOLOGY

## 2019-12-16 PROCEDURE — G8484 FLU IMMUNIZE NO ADMIN: HCPCS | Performed by: DERMATOLOGY

## 2019-12-16 PROCEDURE — 99202 OFFICE O/P NEW SF 15 MIN: CPT | Performed by: DERMATOLOGY

## 2019-12-16 PROCEDURE — 4040F PNEUMOC VAC/ADMIN/RCVD: CPT | Performed by: DERMATOLOGY

## 2019-12-16 PROCEDURE — 1090F PRES/ABSN URINE INCON ASSESS: CPT | Performed by: DERMATOLOGY

## 2019-12-16 PROCEDURE — G8428 CUR MEDS NOT DOCUMENT: HCPCS | Performed by: DERMATOLOGY

## 2020-06-17 ENCOUNTER — OFFICE VISIT (OUTPATIENT)
Dept: DERMATOLOGY | Age: 70
End: 2020-06-17
Payer: MEDICARE

## 2020-06-17 ENCOUNTER — HOSPITAL ENCOUNTER (OUTPATIENT)
Age: 70
Setting detail: SPECIMEN
Discharge: HOME | End: 2020-06-17
Payer: MEDICARE

## 2020-06-17 VITALS
WEIGHT: 176 LBS | BODY MASS INDEX: 28.42 KG/M2 | DIASTOLIC BLOOD PRESSURE: 43 MMHG | TEMPERATURE: 97 F | HEART RATE: 76 BPM | SYSTOLIC BLOOD PRESSURE: 91 MMHG

## 2020-06-17 PROCEDURE — 3017F COLORECTAL CA SCREEN DOC REV: CPT | Performed by: DERMATOLOGY

## 2020-06-17 PROCEDURE — 1036F TOBACCO NON-USER: CPT | Performed by: DERMATOLOGY

## 2020-06-17 PROCEDURE — G8399 PT W/DXA RESULTS DOCUMENT: HCPCS | Performed by: DERMATOLOGY

## 2020-06-17 PROCEDURE — 4040F PNEUMOC VAC/ADMIN/RCVD: CPT | Performed by: DERMATOLOGY

## 2020-06-17 PROCEDURE — 1090F PRES/ABSN URINE INCON ASSESS: CPT | Performed by: DERMATOLOGY

## 2020-06-17 PROCEDURE — 1123F ACP DISCUSS/DSCN MKR DOCD: CPT | Performed by: DERMATOLOGY

## 2020-06-17 PROCEDURE — 11102 TANGNTL BX SKIN SINGLE LES: CPT | Performed by: DERMATOLOGY

## 2020-06-17 PROCEDURE — G8417 CALC BMI ABV UP PARAM F/U: HCPCS | Performed by: DERMATOLOGY

## 2020-06-17 PROCEDURE — 99213 OFFICE O/P EST LOW 20 MIN: CPT | Performed by: DERMATOLOGY

## 2020-06-17 PROCEDURE — G8427 DOCREV CUR MEDS BY ELIG CLIN: HCPCS | Performed by: DERMATOLOGY

## 2020-06-17 RX ORDER — LIDOCAINE HYDROCHLORIDE AND EPINEPHRINE 10; 10 MG/ML; UG/ML
0.5 INJECTION, SOLUTION INFILTRATION; PERINEURAL ONCE
Status: COMPLETED | OUTPATIENT
Start: 2020-06-17 | End: 2020-06-17

## 2020-06-17 RX ADMIN — LIDOCAINE HYDROCHLORIDE AND EPINEPHRINE 0.5 ML: 10; 10 INJECTION, SOLUTION INFILTRATION; PERINEURAL at 10:51

## 2020-06-17 NOTE — PROGRESS NOTES
(SEROQUEL) 300 MG tablet Take 300 mg by mouth every morning       trihexyphenidyl (ARTANE) 2 MG tablet Take 2 mg by mouth nightly      acetaminophen (TYLENOL) 325 MG tablet Take 650 mg by mouth every 4 hours as needed for Pain 2 Q 4 HRS      LORazepam (ATIVAN) 1 MG tablet Take 1 mg by mouth every 6 hours as needed for Anxiety. Devin Oneal Nutritional Supplements (BOOST) LIQD 2 bottles/cans a day (Patient not taking: Reported on 6/17/2020) 60 Can 11    hydrocortisone (ANUSOL-HC) 2.5 % rectal cream Place rectally 2 times daily Place rectally 2 times daily.  Dextromethorphan-guaiFENesin  MG/5ML SYRP Take 5 mLs by mouth every 4 hours as needed for Cough      traZODone (DESYREL) 100 MG tablet Take 100 mg by mouth nightly      diphenhydrAMINE (BENADRYL) 25 MG capsule Take 25 mg by mouth every 6 hours as needed for Itching or Allergies      guaiFENesin (MUCINEX) 600 MG extended release tablet Take 600 mg by mouth 2 times daily as needed for Congestion      Saccharomyces boulardii (PROBIOTIC) 250 MG CAPS Take by mouth      lactulose (CHRONULAC) 10 GM/15ML solution Take 20 g by mouth nightly      polyethylene glycol (GLYCOLAX) packet Take 17 g by mouth daily as needed      QUEtiapine (SEROQUEL) 400 MG tablet Take 400 mg by mouth daily      senna (SENOKOT) 8.6 MG TABS tablet Take 1 tablet by mouth 2 times daily      acetaminophen (TYLENOL) 160 MG/5ML liquid Take 15 mg/kg by mouth every 4 hours as needed for Fever 20 ML Q 4 HRS      bisacodyl (DULCOLAX) 5 MG EC tablet Take 5 mg by mouth as needed for Constipation 1 EVERY 2 DAYS AS NEEDED      shark liver oil-cocoa butter (HEMORRHOID) 0.25-3-85.5 % suppository Place 1 suppository rectally as needed for Hemorrhoids      Tetrahydrozoline HCl (EYE DROPS OP) Apply to eye      BACITRACIN EX Apply topically       No current facility-administered medications for this visit.         ALLERGIES:   Allergies   Allergen Reactions    Augmentin [Amoxicillin-Pot AM EDT

## 2020-06-19 LAB — DERMATOLOGY PATHOLOGY REPORT: NORMAL

## 2020-06-24 ENCOUNTER — APPOINTMENT (OUTPATIENT)
Dept: CT IMAGING | Facility: CLINIC | Age: 70
End: 2020-06-24
Payer: MEDICARE

## 2020-06-24 ENCOUNTER — HOSPITAL ENCOUNTER (EMERGENCY)
Facility: CLINIC | Age: 70
Discharge: HOME | End: 2020-06-24
Attending: EMERGENCY MEDICINE
Payer: MEDICARE

## 2020-06-24 ENCOUNTER — APPOINTMENT (OUTPATIENT)
Dept: GENERAL RADIOLOGY | Facility: CLINIC | Age: 70
End: 2020-06-24
Payer: MEDICARE

## 2020-06-24 VITALS
HEART RATE: 53 BPM | OXYGEN SATURATION: 100 % | WEIGHT: 176 LBS | DIASTOLIC BLOOD PRESSURE: 87 MMHG | RESPIRATION RATE: 16 BRPM | HEIGHT: 66 IN | SYSTOLIC BLOOD PRESSURE: 156 MMHG | TEMPERATURE: 97.7 F | BODY MASS INDEX: 28.28 KG/M2

## 2020-06-24 LAB
-: ABNORMAL
ABSOLUTE EOS #: 0.2 K/UL (ref 0–0.4)
ABSOLUTE IMMATURE GRANULOCYTE: NORMAL K/UL (ref 0–0.3)
ABSOLUTE LYMPH #: 2.6 K/UL (ref 1–4.8)
ABSOLUTE MONO #: 0.6 K/UL (ref 0.1–1.2)
ALBUMIN SERPL-MCNC: 3.7 G/DL (ref 3.5–5.2)
ALBUMIN/GLOBULIN RATIO: 0.8 (ref 1–2.5)
ALP BLD-CCNC: 85 U/L (ref 35–104)
ALT SERPL-CCNC: 10 U/L (ref 5–33)
AMORPHOUS: ABNORMAL
ANION GAP SERPL CALCULATED.3IONS-SCNC: 9 MMOL/L (ref 9–17)
AST SERPL-CCNC: 31 U/L
BACTERIA: ABNORMAL
BASOPHILS # BLD: 1 % (ref 0–2)
BASOPHILS ABSOLUTE: 0 K/UL (ref 0–0.2)
BILIRUB SERPL-MCNC: 0.4 MG/DL (ref 0.3–1.2)
BILIRUBIN URINE: ABNORMAL
BUN BLDV-MCNC: 9 MG/DL (ref 8–23)
BUN/CREAT BLD: ABNORMAL (ref 9–20)
CALCIUM SERPL-MCNC: 9.6 MG/DL (ref 8.6–10.4)
CASTS UA: ABNORMAL /LPF (ref 0–2)
CASTS UA: ABNORMAL /LPF (ref 0–2)
CHLORIDE BLD-SCNC: 107 MMOL/L (ref 98–107)
CO2: 28 MMOL/L (ref 20–31)
COLOR: YELLOW
COMMENT UA: ABNORMAL
CREAT SERPL-MCNC: 0.8 MG/DL (ref 0.5–0.9)
CRYSTALS, UA: ABNORMAL /HPF
DIFFERENTIAL TYPE: NORMAL
EOSINOPHILS RELATIVE PERCENT: 3 % (ref 1–4)
EPITHELIAL CELLS UA: ABNORMAL /HPF (ref 0–5)
GFR AFRICAN AMERICAN: >60 ML/MIN
GFR NON-AFRICAN AMERICAN: >60 ML/MIN
GFR SERPL CREATININE-BSD FRML MDRD: ABNORMAL ML/MIN/{1.73_M2}
GFR SERPL CREATININE-BSD FRML MDRD: ABNORMAL ML/MIN/{1.73_M2}
GLUCOSE BLD-MCNC: 133 MG/DL (ref 70–99)
GLUCOSE URINE: NEGATIVE
HCT VFR BLD CALC: 42.3 % (ref 36–46)
HEMOGLOBIN: 13.8 G/DL (ref 12–16)
IMMATURE GRANULOCYTES: NORMAL %
KETONES, URINE: ABNORMAL
LACTIC ACID: 1.6 MMOL/L (ref 0.5–2.2)
LEUKOCYTE ESTERASE, URINE: NEGATIVE
LIPASE: 27 U/L (ref 13–60)
LYMPHOCYTES # BLD: 43 % (ref 24–44)
MCH RBC QN AUTO: 31.2 PG (ref 26–34)
MCHC RBC AUTO-ENTMCNC: 32.7 G/DL (ref 31–37)
MCV RBC AUTO: 95.4 FL (ref 80–100)
MONOCYTES # BLD: 10 % (ref 2–11)
MUCUS: ABNORMAL
NITRITE, URINE: NEGATIVE
NRBC AUTOMATED: NORMAL PER 100 WBC
OTHER OBSERVATIONS UA: ABNORMAL
PDW BLD-RTO: 13.8 % (ref 12.5–15.4)
PH UA: 5.5 (ref 5–8)
PLATELET # BLD: 332 K/UL (ref 140–450)
PLATELET ESTIMATE: NORMAL
PMV BLD AUTO: 7.7 FL (ref 6–12)
POTASSIUM SERPL-SCNC: 4.9 MMOL/L (ref 3.7–5.3)
PROTEIN UA: ABNORMAL
RBC # BLD: 4.43 M/UL (ref 4–5.2)
RBC # BLD: NORMAL 10*6/UL
RBC UA: ABNORMAL /HPF (ref 0–2)
RENAL EPITHELIAL, UA: ABNORMAL /HPF
SEG NEUTROPHILS: 43 % (ref 36–66)
SEGMENTED NEUTROPHILS ABSOLUTE COUNT: 2.6 K/UL (ref 1.8–7.7)
SODIUM BLD-SCNC: 144 MMOL/L (ref 135–144)
SPECIFIC GRAVITY UA: 1.03 (ref 1–1.03)
TOTAL PROTEIN: 8.1 G/DL (ref 6.4–8.3)
TRICHOMONAS: ABNORMAL
TROPONIN INTERP: NORMAL
TROPONIN T: NORMAL NG/ML
TROPONIN, HIGH SENSITIVITY: 8 NG/L (ref 0–14)
TURBIDITY: CLEAR
URINE HGB: NEGATIVE
UROBILINOGEN, URINE: NORMAL
WBC # BLD: 6 K/UL (ref 3.5–11)
WBC # BLD: NORMAL 10*3/UL
WBC UA: ABNORMAL /HPF (ref 0–5)
YEAST: ABNORMAL

## 2020-06-24 PROCEDURE — 74176 CT ABD & PELVIS W/O CONTRAST: CPT

## 2020-06-24 PROCEDURE — 93005 ELECTROCARDIOGRAM TRACING: CPT | Performed by: EMERGENCY MEDICINE

## 2020-06-24 PROCEDURE — 81001 URINALYSIS AUTO W/SCOPE: CPT

## 2020-06-24 PROCEDURE — 85025 COMPLETE CBC W/AUTO DIFF WBC: CPT

## 2020-06-24 PROCEDURE — 6360000002 HC RX W HCPCS: Performed by: EMERGENCY MEDICINE

## 2020-06-24 PROCEDURE — 70450 CT HEAD/BRAIN W/O DYE: CPT

## 2020-06-24 PROCEDURE — 96372 THER/PROPH/DIAG INJ SC/IM: CPT

## 2020-06-24 PROCEDURE — 83605 ASSAY OF LACTIC ACID: CPT

## 2020-06-24 PROCEDURE — 99284 EMERGENCY DEPT VISIT MOD MDM: CPT

## 2020-06-24 PROCEDURE — 36415 COLL VENOUS BLD VENIPUNCTURE: CPT

## 2020-06-24 PROCEDURE — 83690 ASSAY OF LIPASE: CPT

## 2020-06-24 PROCEDURE — 84484 ASSAY OF TROPONIN QUANT: CPT

## 2020-06-24 PROCEDURE — 71045 X-RAY EXAM CHEST 1 VIEW: CPT

## 2020-06-24 PROCEDURE — 80053 COMPREHEN METABOLIC PANEL: CPT

## 2020-06-24 RX ORDER — 0.9 % SODIUM CHLORIDE 0.9 %
1000 INTRAVENOUS SOLUTION INTRAVENOUS ONCE
Status: DISCONTINUED | OUTPATIENT
Start: 2020-06-24 | End: 2020-06-24 | Stop reason: HOSPADM

## 2020-06-24 RX ORDER — LORAZEPAM 2 MG/ML
1 INJECTION INTRAMUSCULAR ONCE
Status: COMPLETED | OUTPATIENT
Start: 2020-06-24 | End: 2020-06-24

## 2020-06-24 RX ADMIN — LORAZEPAM 1 MG: 2 INJECTION, SOLUTION INTRAMUSCULAR; INTRAVENOUS at 10:51

## 2020-06-24 ASSESSMENT — ENCOUNTER SYMPTOMS
NAUSEA: 0
DIARRHEA: 1
VOMITING: 0
COUGH: 0
ABDOMINAL PAIN: 0

## 2020-06-24 NOTE — ED PROVIDER NOTES
[facility]  [facility]  Phone: [phone]        Pt Name: Trinidad Dunbar  MRN: [id number]  [place] 1950  Date of evaluation: 6/24/20      CHIEF COMPLAINT       Chief Complaint   Patient presents with    Fatigue         HISTORY OF PRESENT ILLNESS    Trinidad Dunbar is a 68 y.o. female who presents from a group home with complaints of decreased oral intake she is refusing breakfast and lunch even protein shakes. She is nonverbal severely mentally retarded she has had some falls but is acting normal for her. They are afraid she is dehydrated or may have injured herself. She is been having some loose stools  But no blood in her stools she has had no vomiting. REVIEW OF SYSTEMS         Review of Systems   Reason unable to perform ROS: Review of systems is done through the caregiver and is limited as patient is nonverbal.   Constitutional: Positive for appetite change. Negative for fever. HENT: Negative for congestion and ear pain. Respiratory: Negative for cough. Gastrointestinal: Positive for diarrhea. Negative for abdominal pain, nausea and vomiting. Endocrine: Negative for polydipsia and polyuria. Genitourinary: Negative for difficulty urinating, dysuria and frequency. Skin: Negative for rash. Neurological: Negative for dizziness, weakness and headaches. Hematological: Does not bruise/bleed easily. PAST MEDICAL HISTORY    has a past medical history of Allergic rhinitis, Bipolar affective ([address] Constipation, Contracture of muscle of multiple sites, Dermatomyositis ([address] Dysphagia, Hypercholesterolemia, Hypertension, Mental retardation, profound (I.Q. < 20), Neuroleptic-induced tardive dyskinesia, PTSD (post-traumatic stress disorder), Pyloric ulcer, and Schizophrenia ([address] SURGICAL HISTORY      has a past surgical history that includes Abdomen surgery; Colonoscopy (12/13/2017); pr colon ca scrn not hi rsk ind (N/A, 12/13/2017);  Colonoscopy (INDERAL) 60 MG TABLET    Take 60 mg by mouth 2 times daily 2 BID    QUETIAPINE (SEROQUEL) 300 MG TABLET    Take 300 mg by mouth every morning     QUETIAPINE (SEROQUEL) 400 MG TABLET    Take 400 mg by mouth daily    SACCHAROMYCES BOULARDII (PROBIOTIC) 250 MG CAPS    Take by mouth    SENNA (SENOKOT) 8.6 MG TABS TABLET    Take 1 tablet by mouth 2 times daily    SHARK LIVER OIL-COCOA BUTTER (HEMORRHOID) 0.25-3-85.5 % SUPPOSITORY    Place 1 suppository rectally as needed for Hemorrhoids    SODIUM PHOSPHATES (FLEET) 7-19 GM/118ML    Place 1 enema rectally once as needed    TETRAHYDROZOLINE HCL (EYE DROPS OP)    Apply to eye    TRAZODONE (DESYREL) 100 MG TABLET    Take 100 mg by mouth nightly    TRIAMCINOLONE (KENALOG) 0.1 % OINTMENT    Apply twice daily as needed for itchy raised rash    TRIHEXYPHENIDYL (ARTANE) 2 MG TABLET    Take 2 mg by mouth nightly    VALPROATE (DEPAKENE) 250 MG/5ML SOLUTION    Take 25 mLs by mouth nightly       ALLERGIES     is allergic to augmentin [amoxicillin-pot clavulanate]; cogentin [benztropine]; and methotrexate derivatives. FAMILY HISTORY     She indicated that her mother is alive. family history is not on file. SOCIAL HISTORY      reports that she has never smoked. She has never used smokeless tobacco. She reports that she does not drink alcohol or use drugs. PHYSICAL EXAM     INITIAL VITALS:  height is 5' 6\" (1.676 m) and weight is 79.8 kg (176 lb). Her axillary temperature is 97.7 °F (36.5 °C). Her blood pressure is 141/98 (abnormal) and her pulse is 72. Her respiration is 24 and oxygen saturation is 98%. Physical Exam  Constitutional:       Appearance: She is well-developed. Comments: Elderly female appears in no distress she moves all 4 extremities but does not follow any commands   HENT:      Head: Normocephalic and atraumatic.       Right Ear: External ear normal.      Left Ear: External ear normal.   Eyes:      Conjunctiva/sclera: Conjunctivae normal. Urinalysis Reflex to Culture   Result Value Ref Range    Color, UA YELLOW YELLOW    Turbidity UA CLEAR CLEAR    Glucose, Ur NEGATIVE NEGATIVE    Bilirubin Urine NEGATIVE  Verified by ictotest. (A) NEGATIVE    Ketones, Urine TRACE (A) NEGATIVE    Specific Gravity, UA 1.028 1.005 - 1.030    Urine Hgb NEGATIVE NEGATIVE    pH, UA 5.5 5.0 - 8.0    Protein, UA 1+ (A) NEGATIVE    Urobilinogen, Urine Normal Normal    Nitrite, Urine NEGATIVE NEGATIVE    Leukocyte Esterase, Urine NEGATIVE NEGATIVE    Urinalysis Comments NOT REPORTED    Microscopic Urinalysis   Result Value Ref Range    -          WBC, UA 0 TO 2 0 - 5 /HPF    RBC, UA None 0 - 2 /HPF    Casts UA 2 TO 5 0 - 2 /LPF    Casts UA HYALINE 0 - 2 /LPF    Crystals, UA NOT REPORTED None /HPF    Epithelial Cells UA 10 TO 20 0 - 5 /HPF    Renal Epithelial, UA NOT REPORTED 0 /HPF    Bacteria, UA None None    Mucus, UA NOT REPORTED None    Trichomonas, UA NOT REPORTED None    Amorphous, UA NOT REPORTED None    Other Observations UA (A) NOT REQ. Utilizing a urinalysis as the only screening method to exclude a potential uropathogen can be unreliable in many patient populations. Rapid screening tests are less sensitive than culture and if UTI is a clinical possibility, culture should be considered despite a negative urinalysis.     Yeast, UA NOT REPORTED None   EKG 12 Lead   Result Value Ref Range    Ventricular Rate 75 BPM    Atrial Rate 75 BPM    P-R Interval 148 ms    QRS Duration 72 ms    Q-T Interval 346 ms    QTc Calculation (Bazett) 386 ms    P Axis 76 degrees    R Axis 26 degrees    T Axis 40 degrees           EMERGENCY DEPARTMENT COURSE:   Vitals:    Vitals:    06/24/20 0952 06/24/20 0954   BP:  (!) 141/98   Pulse:  72   Resp:  24   Temp:  97.7 °F (36.5 °C)   TempSrc:  Axillary   SpO2:  98%   Weight: 79.8 kg (176 lb)    Height: 5' 6\" (1.676 m)      -------------------------  BP: (!) 141/98, Temp: 97.7 °F (36.5 °C), Pulse: 72, Resp: 24      On repeat exam patient is resting comfortably    CONSULTS:      PROCEDURES:  None    FINAL IMPRESSION      1. Fatigue, unspecified type          DISPOSITION/PLAN     Discharged in stable condition  PATIENT REFERRED TO:  No follow-up provider specified. DISCHARGE MEDICATIONS:  New Prescriptions    No medications on file       (Please note that portions of this note were completed with a voice recognition program.  Efforts were made to edit the dictations but occasionally words are mis-transcribed.)    Marie MD, F.A.A.E.M.   Attending Emergency Medicine Physician      Margaret Figueredo MD  06/24/20 6705

## 2020-06-25 ENCOUNTER — CARE COORDINATION (OUTPATIENT)
Dept: CARE COORDINATION | Age: 70
End: 2020-06-25

## 2020-06-25 LAB
EKG ATRIAL RATE: 75 BPM
EKG P AXIS: 76 DEGREES
EKG P-R INTERVAL: 148 MS
EKG Q-T INTERVAL: 346 MS
EKG QRS DURATION: 72 MS
EKG QTC CALCULATION (BAZETT): 386 MS
EKG R AXIS: 26 DEGREES
EKG T AXIS: 40 DEGREES
EKG VENTRICULAR RATE: 75 BPM

## 2020-07-21 ENCOUNTER — TELEPHONE (OUTPATIENT)
Dept: PRIMARY CARE CLINIC | Age: 70
End: 2020-07-21

## 2020-07-21 ENCOUNTER — OFFICE VISIT (OUTPATIENT)
Dept: PRIMARY CARE CLINIC | Age: 70
End: 2020-07-21
Payer: MEDICARE

## 2020-07-21 VITALS
OXYGEN SATURATION: 99 % | RESPIRATION RATE: 16 BRPM | SYSTOLIC BLOOD PRESSURE: 108 MMHG | DIASTOLIC BLOOD PRESSURE: 80 MMHG | HEART RATE: 60 BPM

## 2020-07-21 PROCEDURE — 1123F ACP DISCUSS/DSCN MKR DOCD: CPT | Performed by: INTERNAL MEDICINE

## 2020-07-21 PROCEDURE — 1036F TOBACCO NON-USER: CPT | Performed by: INTERNAL MEDICINE

## 2020-07-21 PROCEDURE — 4040F PNEUMOC VAC/ADMIN/RCVD: CPT | Performed by: INTERNAL MEDICINE

## 2020-07-21 PROCEDURE — 3017F COLORECTAL CA SCREEN DOC REV: CPT | Performed by: INTERNAL MEDICINE

## 2020-07-21 PROCEDURE — 99214 OFFICE O/P EST MOD 30 MIN: CPT | Performed by: INTERNAL MEDICINE

## 2020-07-21 PROCEDURE — G8399 PT W/DXA RESULTS DOCUMENT: HCPCS | Performed by: INTERNAL MEDICINE

## 2020-07-21 PROCEDURE — 1090F PRES/ABSN URINE INCON ASSESS: CPT | Performed by: INTERNAL MEDICINE

## 2020-07-21 PROCEDURE — G8417 CALC BMI ABV UP PARAM F/U: HCPCS | Performed by: INTERNAL MEDICINE

## 2020-07-21 PROCEDURE — G8427 DOCREV CUR MEDS BY ELIG CLIN: HCPCS | Performed by: INTERNAL MEDICINE

## 2020-07-21 NOTE — TELEPHONE ENCOUNTER
Representative from the patient's group home called stating Dr. Parker Roman has placed an order for the patient to have a hospital bed, but she needs the office note stating why the patient needs the hospital bed. Please be sure to add to note when finishing. Fax to [phone] when completed.

## 2020-07-23 NOTE — TELEPHONE ENCOUNTER
Aureliano Ariza at [facility] was checking to see when the paperwork for the Hospital bed would be faxed over for [facility].     Please call her cell with an update- [phone]

## 2020-07-24 ASSESSMENT — ENCOUNTER SYMPTOMS
ABDOMINAL PAIN: 0
SINUS PAIN: 0
WHEEZING: 0
CONSTIPATION: 0
SINUS PRESSURE: 0
SHORTNESS OF BREATH: 0
DIARRHEA: 0
VOMITING: 0
COUGH: 0
ABDOMINAL DISTENTION: 0
NAUSEA: 0
BACK PAIN: 0

## 2020-07-24 NOTE — PROGRESS NOTES
[address] PRIMARY CARE  [address] [facility] 1560  [address] [address]  Dept: [phone]  Dept Fax: [phone]    Kevin Noble is a 65 y.o. female who presents today for her medical conditions/complaints as noted below. Chief Complaint   Patient presents with   [facility] Patient     Establish Care. Discuss Hospital Bed       HPI:     This is a 75-year-old female who is here to establish care. She is from [facility] and she is accompanied by Karlie Daniel. She has past medical history of nonpsychotic mental disorder, bipolar 1 disorder and she is bedbound. She is oldest member of the nursing home. It has been difficult to get her up and moving in the regular bed by the nursing aides and she would do better with hospital bed as she is unable to move or change positions. This would also prevent her from getting bedsores. Reviewed all the meds and updated the list.  Blood pressure is normal.      Hemoglobin A1C (%)   Date Value   2018 5.2   2017 5.6   2016 4.6             ( goal A1C is < 7)   No results found for: LABMICR  LDL Cholesterol (mg/dL)   Date Value   2017 90   2016 103     LDL Calculated (mg/dL)   Date Value   2019 76   2018 81   10/29/2015 95       (goal LDL is <100)   AST (U/L)   Date Value   2020 31     ALT (U/L)   Date Value   2020 10     BUN (mg/dL)   Date Value   2020 9     BP Readings from Last 3 Encounters:   20 108/80   20 (!) 156/87   20 (!) 91/43          (goal 120/80)    Past Medical History:   Diagnosis Date    Allergic rhinitis     Bipolar affective (HCC)     Constipation     Contracture of muscle of multiple sites     bilateral upper extremities.     Dermatomyositis ([address]     Dysphagia     Hypercholesterolemia     Hypertension     Mental retardation, profound (I.Q. < 20)     Neuroleptic-induced tardive dyskinesia     PTSD (post-traumatic stress disorder)     Pyloric ulcer     w stenosis    Schizophrenia [facility])       Past Surgical History:   Procedure Laterality Date    ABDOMEN SURGERY      pyloric ulcer surgery    COLONOSCOPY  12/13/2017    attempted-poor prep    COLONOSCOPY  02/08/2018    suboptimal prep; redundant colon; small internal hemorrhoids    AR COLON CA SCRN NOT HI RSK IND N/A 12/13/2017    COLONOSCOPY, ATTEMPTED / POOR -PREP performed by Lesa Fernandez MD at [address] [place] [address] NOT  [address] IND N/A 2/8/2018    COLONOSCOPY performed by Lesa Fernandez MD at [place] [address] reviewed. No pertinent family history. Social History     Tobacco Use    Smoking status: Never Smoker    Smokeless tobacco: Never Used   Substance Use Topics    Alcohol use: No     Alcohol/week: 0.0 standard drinks      Current Outpatient Medications   Medication Sig Dispense Refill    Cholecalciferol (VITAMIN D) 2000 units TABS tablet Take 1 tablet by mouth daily 90 tablet 3    Nutritional Supplements (BOOST) LIQD 2 bottles/cans a day 60 Can 11    atorvastatin (LIPITOR) 40 MG tablet Take 1 tablet by mouth daily 90 tablet 3    amLODIPine (NORVASC) 5 MG tablet Take 1 tablet by mouth daily 30 tablet 3    valproate (DEPAKENE) 250 MG/5ML solution Take 25 mLs by mouth nightly 1 Bottle 0    hydrocortisone (ANUSOL-HC) 2.5 % rectal cream Place rectally 2 times daily Place rectally 2 times daily.       Dextromethorphan-guaiFENesin  MG/5ML SYRP Take 5 mLs by mouth every 4 hours as needed for Cough      traZODone (DESYREL) 100 MG tablet Take 100 mg by mouth nightly      diphenhydrAMINE (BENADRYL) 25 MG capsule Take 25 mg by mouth every 6 hours as needed for Itching or Allergies      ibuprofen (ADVIL;MOTRIN) 200 MG tablet Take 200 mg by mouth every 6 hours as needed for Pain      magnesium hydroxide (MILK OF MAGNESIA) 400 MG/5ML suspension Take 30 mLs by mouth daily as needed for Constipation      guaiFENesin (MUCINEX) 600 MG extended release tablet Take 600 mg by mouth 2 times daily as needed for Congestion      Saccharomyces boulardii (PROBIOTIC) 250 MG CAPS Take by mouth      famotidine (PEPCID) 20 MG tablet Take 20 mg by mouth 2 times daily      ipratropium (ATROVENT) 0.03 % nasal spray 2 sprays by Nasal route 2 times daily      Multiple Vitamins-Minerals (MULTIVITAMIN PO) Take by mouth daily      polyethylene glycol (GLYCOLAX) packet Take 17 g by mouth daily as needed      propranolol (INDERAL) 60 MG tablet Take 120 mg by mouth 2 times daily BID      QUEtiapine (SEROQUEL) 300 MG tablet Take 300 mg by mouth every morning       QUEtiapine (SEROQUEL) 400 MG tablet Take 400 mg by mouth daily      senna (SENOKOT) 8.6 MG TABS tablet Take 1 tablet by mouth 2 times daily      trihexyphenidyl (ARTANE) 2 MG tablet Take 2 mg by mouth nightly      acetaminophen (TYLENOL) 160 MG/5ML liquid Take 15 mg/kg by mouth every 4 hours as needed for Fever 20 ML Q 4 HRS      acetaminophen (TYLENOL) 325 MG tablet Take 650 mg by mouth every 4 hours as needed for Pain 2 Q 4 HRS      bisacodyl (DULCOLAX) 5 MG EC tablet Take 5 mg by mouth as needed for Constipation 1 EVERY 2 DAYS AS NEEDED      Tetrahydrozoline HCl (EYE DROPS OP) Apply to eye       No current facility-administered medications for this visit.       Allergies   Allergen Reactions    Augmentin [Amoxicillin-Pot Clavulanate]     Cogentin [Benztropine]     Methotrexate Derivatives        Health Maintenance   Topic Date Due    Hepatitis C screen  1950    Shingles Vaccine (2 of 3) 03/16/2015    Colon cancer screen colonoscopy  02/08/2020    Flu vaccine (1) 09/01/2020    Annual Wellness Visit (AWV)  11/13/2020    Lipid screen  11/27/2020    Potassium monitoring  06/24/2021    Creatinine monitoring  06/24/2021    Breast cancer screen  07/24/2021    DTaP/Tdap/Td vaccine (2 - Tdap) 04/17/2025    DEXA (modify frequency per FRAX score)  Completed    Pneumococcal 65+ years Vaccine  Completed    Hepatitis A vaccine  Aged Out    Hepatitis B vaccine  Aged Out    Hib vaccine  Aged Out    Meningococcal (ACWY) vaccine  Aged Out       Subjective:      Review of Systems   Constitutional: Negative for activity change, appetite change, chills, fatigue and fever. HENT: Negative for congestion, ear pain, hearing loss, nosebleeds, sinus pressure, sinus pain and sneezing. Eyes: Negative for visual disturbance. Respiratory: Negative for cough, shortness of breath and wheezing. Cardiovascular: Negative for chest pain and palpitations. Gastrointestinal: Negative for abdominal distention, abdominal pain, constipation, diarrhea, nausea and vomiting. Endocrine: Negative for cold intolerance, heat intolerance, polydipsia, polyphagia and polyuria. Genitourinary: Negative for difficulty urinating, menstrual problem, vaginal bleeding and vaginal discharge. Musculoskeletal: Negative for back pain and joint swelling. Skin: Negative for rash. Neurological: Negative for numbness and headaches. Psychiatric/Behavioral: Positive for behavioral problems, confusion and decreased concentration. Negative for sleep disturbance. The patient is nervous/anxious. All other systems reviewed and are negative. Objective:     Physical Exam  Vitals signs and nursing note reviewed. Constitutional:       General: She is not in acute distress. Appearance: She is well-developed. She is not diaphoretic. HENT:      Head: Normocephalic and atraumatic. Right Ear: Hearing normal.      Left Ear: Hearing normal.      Mouth/Throat:      Pharynx: Uvula midline. Eyes:      General: No scleral icterus. Conjunctiva/sclera: Conjunctivae normal.      Pupils: Pupils are equal, round, and reactive to light. Neck:      Musculoskeletal: Full passive range of motion without pain, normal range of motion and neck supple. Thyroid: No thyroid mass or thyromegaly. Vascular: No JVD.       Trachea: Phonation normal. Cardiovascular:      Rate and Rhythm: Normal rate and regular rhythm. Pulses: No decreased pulses. Carotid pulses are 2+ on the right side and 2+ on the left side. Radial pulses are 2+ on the right side and 2+ on the left side. Heart sounds: Normal heart sounds. No murmur. Pulmonary:      Effort: Pulmonary effort is normal. No accessory muscle usage or respiratory distress. Breath sounds: Normal breath sounds. No wheezing or rales. Abdominal:      General: Bowel sounds are normal. There is no distension. Palpations: Abdomen is soft. Tenderness: There is no abdominal tenderness. Musculoskeletal: Normal range of motion. General: No deformity. Lymphadenopathy:      Cervical: No cervical adenopathy. Skin:     General: Skin is warm. Capillary Refill: Capillary refill takes less than 2 seconds. Findings: No rash. Neurological:      Mental Status: She is alert and oriented to person, place, and time. Deep Tendon Reflexes: Reflexes normal.   Psychiatric:         Mood and Affect: Affect is blunt, flat and inappropriate. Speech: She is noncommunicative. Behavior: Behavior is slowed and withdrawn. /80   Pulse 60   Resp 16   SpO2 99%     Assessment:          1. Encounter to establish care with new doctor      2. Bipolar 1 disorder (HCC)  depakote    3. Essential hypertension  Well controlled    4. Nonpsychotic mental disorder   Bed ridden   Need hhospital bed     5. PTSD (post-traumatic stress disorder)  seroquel    6. Anxiety  trazodone    7. Colon cancer screening    - Cologuard (For External Results Only); Future            Diagnosis Orders   1. Encounter to establish care with new doctor     2. Bipolar 1 disorder ([facility]     3. Essential hypertension     4. Nonpsychotic mental disorder      5. PTSD (post-traumatic stress disorder)     6. Anxiety     7.  Colon cancer screening  Cologuard (For External Results Only) Plan:      Return in about 5 months (around 12/21/2020). Orders Placed This Encounter   Procedures    Cologuard (For External Results Only)     This test is performed by an external laboratory and is used for result attachment only. It is required that this order requisition be faxed to: [facility] [facility] @ [phone]. See [email or web address] for further information. Standing Status:   Future     Standing Expiration Date:   7/21/2021     No orders of the defined types were placed in this encounter. Patient given educational materials - see patient instructions. Discussed use, benefit, and side effects of prescribedmedications. All patient questions answered. Pt voiced understanding. Reviewed health maintenance. Instructed to continue current medications, diet and exercise. Patient agreed with treatment plan. Follow up as directed. I spent a total of 45 minutes face to face with this patient. Over 50% of that time was spent on counseling and care coordination. Please see assessment and plan for details. Electronically signed by Logan Hodges MD on 7/24/2020 at 3:23 PM      Please note that this chart was generated using voice recognition Dragon dictation software. Although every effort was made to ensure the accuracy of this automatedtranscription, some errors in transcription may have occurred.

## 2020-07-27 NOTE — TELEPHONE ENCOUNTER
Writer spoke with Edin Fine at [facility] who states that Codie, , will  note later today and it does not need to be faxed.  Note has been placed in [address] per Silvia's request

## 2020-10-27 ENCOUNTER — HOSPITAL ENCOUNTER (EMERGENCY)
Facility: CLINIC | Age: 70
Discharge: HOME | End: 2020-10-27
Attending: EMERGENCY MEDICINE
Payer: MEDICARE

## 2020-10-27 ENCOUNTER — APPOINTMENT (OUTPATIENT)
Dept: GENERAL RADIOLOGY | Facility: CLINIC | Age: 70
End: 2020-10-27
Payer: MEDICARE

## 2020-10-27 VITALS
BODY MASS INDEX: 26.16 KG/M2 | DIASTOLIC BLOOD PRESSURE: 81 MMHG | TEMPERATURE: 97.3 F | SYSTOLIC BLOOD PRESSURE: 133 MMHG | HEIGHT: 65 IN | RESPIRATION RATE: 20 BRPM | WEIGHT: 157 LBS

## 2020-10-27 PROCEDURE — 6360000002 HC RX W HCPCS: Performed by: EMERGENCY MEDICINE

## 2020-10-27 PROCEDURE — 73130 X-RAY EXAM OF HAND: CPT

## 2020-10-27 PROCEDURE — 96372 THER/PROPH/DIAG INJ SC/IM: CPT

## 2020-10-27 PROCEDURE — 99284 EMERGENCY DEPT VISIT MOD MDM: CPT

## 2020-10-27 RX ORDER — LORAZEPAM 2 MG/ML
0.5 INJECTION INTRAMUSCULAR ONCE
Status: COMPLETED | OUTPATIENT
Start: 2020-10-27 | End: 2020-10-27

## 2020-10-27 RX ADMIN — LORAZEPAM 0.5 MG: 2 INJECTION, SOLUTION INTRAMUSCULAR; INTRAVENOUS at 18:06

## 2020-10-27 ASSESSMENT — PAIN SCALES - WONG BAKER: WONGBAKER_NUMERICALRESPONSE: 2

## 2020-10-27 NOTE — ED TRIAGE NOTES
Patient was found down on the floor in her room at [address] [place] [address]. According to the patients caregiver she is acting as she usually does. Patient in unable to communicate. Patient is mumbling incomprehensible speech however caregiver states this is normal for the patient. Caregiver wanted fingers x-rayed.

## 2020-10-27 NOTE — ED PROVIDER NOTES
[facility]  [facility]  Phone: [phone]        Pt Name: Darlene Chaudhary  MRN: [id number]  [place] 1950  Date of evaluation: 10/27/20      CHIEF COMPLAINT     Chief Complaint   Patient presents with    Fall         HISTORY OF PRESENT ILLNESS  (Location/Symptom, Timing/Onset, Context/Setting, Quality, Duration, Modifying Factors, Severity.)    Darlene Chaudhary is a 65 y.o. female who presents after reportedly falling out of bed. She was discovered on the floor, on top of her blanket. Patient is developmentally delayed and does not speak. She has a  with her. They don't believe she struck her head. No LOC. The were concerned that her fingers on her left hand looked abnormal, so they are here for evaluation of her hand. REVIEW OF SYSTEMS    (2-9 systems for level 4, 10 or more for level 5)     Review of Systems   Unable to perform ROS: Patient nonverbal       PAST MEDICAL HISTORY    has a past medical history of Allergic rhinitis, Bipolar affective ([address] Constipation, Contracture of muscle of multiple sites, Dermatomyositis ([address] Dysphagia, Hypercholesterolemia, Hypertension, Mental retardation, profound (I.Q. < 20), Neuroleptic-induced tardive dyskinesia, PTSD (post-traumatic stress disorder), Pyloric ulcer, and Schizophrenia ([address] SURGICAL HISTORY      has a past surgical history that includes Abdomen surgery; Colonoscopy (12/13/2017); pr colon ca scrn not hi rsk ind (N/A, 12/13/2017); Colonoscopy (02/08/2018); and pr colon ca scrn not hi rsk ind (N/A, 2/8/2018).     CURRENTMEDICATIONS       Previous Medications    ACETAMINOPHEN (TYLENOL) 160 MG/5ML LIQUID    Take 15 mg/kg by mouth every 4 hours as needed for Fever 20 ML Q 4 HRS    ACETAMINOPHEN (TYLENOL) 325 MG TABLET    Take 650 mg by mouth every 4 hours as needed for Pain 2 Q 4 HRS    AMLODIPINE (NORVASC) 5 MG TABLET    Take 1 tablet by mouth daily    ATORVASTATIN (LIPITOR) 40 MG TABLET    Take 1 tablet by mouth daily    BISACODYL (DULCOLAX) 5 MG EC TABLET    Take 5 mg by mouth as needed for Constipation 1 EVERY 2 DAYS AS NEEDED    CHOLECALCIFEROL (VITAMIN D) 2000 UNITS TABS TABLET    Take 1 tablet by mouth daily    DEXTROMETHORPHAN-GUAIFENESIN  MG/5ML SYRP    Take 5 mLs by mouth every 4 hours as needed for Cough    DIPHENHYDRAMINE (BENADRYL) 25 MG CAPSULE    Take 25 mg by mouth every 6 hours as needed for Itching or Allergies    FAMOTIDINE (PEPCID) 20 MG TABLET    Take 20 mg by mouth 2 times daily    GUAIFENESIN (MUCINEX) 600 MG EXTENDED RELEASE TABLET    Take 600 mg by mouth 2 times daily as needed for Congestion    HYDROCORTISONE (ANUSOL-HC) 2.5 % RECTAL CREAM    Place rectally 2 times daily Place rectally 2 times daily.     IBUPROFEN (ADVIL;MOTRIN) 200 MG TABLET    Take 200 mg by mouth every 6 hours as needed for Pain    IPRATROPIUM (ATROVENT) 0.03 % NASAL SPRAY    2 sprays by Nasal route 2 times daily    MAGNESIUM HYDROXIDE (MILK OF MAGNESIA) 400 MG/5ML SUSPENSION    Take 30 mLs by mouth daily as needed for Constipation    MULTIPLE VITAMINS-MINERALS (MULTIVITAMIN PO)    Take by mouth daily    NUTRITIONAL SUPPLEMENTS (BOOST) LIQD    2 bottles/cans a day    POLYETHYLENE GLYCOL (GLYCOLAX) PACKET    Take 17 g by mouth daily as needed    PROPRANOLOL (INDERAL) 60 MG TABLET    Take 120 mg by mouth 2 times daily BID    QUETIAPINE (SEROQUEL) 300 MG TABLET    Take 300 mg by mouth every morning     QUETIAPINE (SEROQUEL) 400 MG TABLET    Take 400 mg by mouth daily    SACCHAROMYCES BOULARDII (PROBIOTIC) 250 MG CAPS    Take by mouth    SENNA (SENOKOT) 8.6 MG TABS TABLET    Take 1 tablet by mouth 2 times daily    TETRAHYDROZOLINE HCL (EYE DROPS OP)    Apply to eye    TRAZODONE (DESYREL) 100 MG TABLET    Take 100 mg by mouth nightly    TRIHEXYPHENIDYL (ARTANE) 2 MG TABLET    Take 2 mg by mouth nightly    VALPROATE (DEPAKENE) 250 MG/5ML SOLUTION    Take 25 mLs by mouth nightly       ALLERGIES     is allergic to lower leg: No edema. Comments: She does appear to have some swan-neck hyperextension of the fourth and fifth digits. However, there is no swelling. There are no contusions. No crepitus. Is unclear on exam whether this is chronic or an acute finding, though there are no obvious secondary signs of injury. Skin:     Capillary Refill: Capillary refill takes less than 2 seconds. Neurological:      Mental Status: She is alert. Mental status is at baseline. DIFFERENTIAL DIAGNOSIS/ MDM:     66-year-old female here after a fall out of bed. Patient is unable to give history as she is nonverbal due to severe mental retardation. The facility in which she resides was concerned that her left hand appeared injured. Plan for x-rays of the left hand. Otherwise, I am unable to find any other evidence of acute injury. DIAGNOSTIC RESULTS     EKG: All EKG's are interpreted by the Emergency Department Physician who either signs or Co-signs this chart in the [address] a cardiologist.    none    RADIOLOGY:  Non-plain film images such as CT, Ultrasound and MRI are read by the radiologist. Naya Layer radiographic images are visualized and the radiologist interpretations are reviewed as follows:         Interpretation per the Radiologist below, if available at the time of this note:    Xr Hand Left (min 3 Views)    Result Date: 10/27/2020  EXAMINATION: THREE XRAY VIEWS OF THE LEFT HAND 10/27/2020 5:51 pm COMPARISON: None. HISTORY: ORDERING SYSTEM PROVIDED HISTORY: finger pain TECHNOLOGIST PROVIDED HISTORY: finger pain Reason for Exam: Fall, pain in fingers. Pt non verbal, unable to hold still, pt moving hand and slamming hand onto cassette, best possible images with restraint. Acuity: Acute Type of Exam: Initial Mechanism of Injury: fall FINDINGS: There is no evidence of acute fracture. There is normal alignment. No acute joint abnormality. No focal osseous lesion. No focal soft tissue abnormality.      No acute osseous abnormality. LABS:  No results found for this visit on 10/27/20.    none    EMERGENCY DEPARTMENT COURSE:   Vitals:    Vitals:    10/27/20 1752 10/27/20 1753   BP:  133/81   Resp:  20   Temp: 97.3 °F (36.3 °C) 97.3 °F (36.3 °C)   TempSrc: Axillary Oral   Weight:  71.2 kg (157 lb)   Height:  5' 5\" (1.651 m)     -------------------------  BP: 133/81, Temp: 97.3 °F (36.3 °C),  , Resp: 20      RE-EVALUATION:  Patient resting comfortably. Caregiver updated on results of x-ray. CONSULTS:  none    PROCEDURES:  None    FINAL IMPRESSION      1.  Fall, initial encounter          DISPOSITION/PLAN   DISPOSITION        CONDITION ON DISPOSITION:   stable    PATIENT REFERRED TO:  Johnson Kent MD  [address]  [phone]    Schedule an appointment as soon as possible for a visit in 2 days        DISCHARGE MEDICATIONS:  New Prescriptions    No medications on file       (Please note that portions of this note were completed with a voicerecognition program.  Efforts were made to edit the dictations but occasionally words are mis-transcribed.)    Marybeth Dumas MD  Attending Emergency Medicine Physician        Marybeth Dumas MD  10/27/20 Iveth Callaway

## 2020-12-10 ENCOUNTER — HOSPITAL ENCOUNTER (OUTPATIENT)
Age: 70
Setting detail: SPECIMEN
Discharge: HOME | End: 2020-12-10
Payer: MEDICARE

## 2020-12-12 LAB — SARS-COV-2, NAA: NOT DETECTED

## 2020-12-14 ENCOUNTER — HOSPITAL ENCOUNTER (EMERGENCY)
Facility: CLINIC | Age: 70
Discharge: HOME | End: 2020-12-14
Attending: EMERGENCY MEDICINE
Payer: MEDICARE

## 2020-12-14 ENCOUNTER — APPOINTMENT (OUTPATIENT)
Dept: GENERAL RADIOLOGY | Facility: CLINIC | Age: 70
End: 2020-12-14
Payer: MEDICARE

## 2020-12-14 VITALS
DIASTOLIC BLOOD PRESSURE: 62 MMHG | RESPIRATION RATE: 16 BRPM | TEMPERATURE: 98.2 F | SYSTOLIC BLOOD PRESSURE: 105 MMHG | OXYGEN SATURATION: 95 % | HEART RATE: 77 BPM

## 2020-12-14 LAB
-: ABNORMAL
ABSOLUTE EOS #: 0.23 K/UL (ref 0–0.4)
ABSOLUTE IMMATURE GRANULOCYTE: ABNORMAL K/UL (ref 0–0.3)
ABSOLUTE LYMPH #: 1.73 K/UL (ref 1–4.8)
ABSOLUTE MONO #: 0.98 K/UL (ref 0.1–0.8)
ALBUMIN SERPL-MCNC: 2.8 G/DL (ref 3.5–5.2)
ALBUMIN/GLOBULIN RATIO: 0.5 (ref 1–2.5)
ALP BLD-CCNC: 66 U/L (ref 35–104)
ALT SERPL-CCNC: 6 U/L (ref 5–33)
AMORPHOUS: ABNORMAL
ANION GAP SERPL CALCULATED.3IONS-SCNC: 11 MMOL/L (ref 9–17)
AST SERPL-CCNC: 31 U/L
BACTERIA: ABNORMAL
BASOPHILS # BLD: 0 % (ref 0–2)
BASOPHILS ABSOLUTE: 0 K/UL (ref 0–0.2)
BILIRUB SERPL-MCNC: 0.3 MG/DL (ref 0.3–1.2)
BILIRUBIN URINE: ABNORMAL
BUN BLDV-MCNC: 15 MG/DL (ref 8–23)
BUN/CREAT BLD: ABNORMAL (ref 9–20)
CALCIUM SERPL-MCNC: 9 MG/DL (ref 8.6–10.4)
CASTS UA: ABNORMAL /LPF (ref 0–2)
CHLORIDE BLD-SCNC: 111 MMOL/L (ref 98–107)
CO2: 25 MMOL/L (ref 20–31)
COLOR: YELLOW
COMMENT UA: ABNORMAL
CREAT SERPL-MCNC: 0.5 MG/DL (ref 0.5–0.9)
CRYSTALS, UA: ABNORMAL /HPF
DIFFERENTIAL TYPE: ABNORMAL
EOSINOPHILS RELATIVE PERCENT: 3 % (ref 1–4)
EPITHELIAL CELLS UA: ABNORMAL /HPF (ref 0–5)
GFR AFRICAN AMERICAN: >60 ML/MIN
GFR NON-AFRICAN AMERICAN: >60 ML/MIN
GFR SERPL CREATININE-BSD FRML MDRD: ABNORMAL ML/MIN/{1.73_M2}
GFR SERPL CREATININE-BSD FRML MDRD: ABNORMAL ML/MIN/{1.73_M2}
GLUCOSE BLD-MCNC: 126 MG/DL (ref 70–99)
GLUCOSE URINE: NEGATIVE
HCT VFR BLD CALC: 39.4 % (ref 36–46)
HEMOGLOBIN: 12.5 G/DL (ref 12–16)
IMMATURE GRANULOCYTES: ABNORMAL %
KETONES, URINE: ABNORMAL
LEUKOCYTE ESTERASE, URINE: ABNORMAL
LYMPHOCYTES # BLD: 23 % (ref 24–44)
MCH RBC QN AUTO: 29.3 PG (ref 26–34)
MCHC RBC AUTO-ENTMCNC: 31.7 G/DL (ref 31–37)
MCV RBC AUTO: 92.4 FL (ref 80–100)
MONOCYTES # BLD: 13 % (ref 1–7)
MORPHOLOGY: NORMAL
MUCUS: ABNORMAL
NITRITE, URINE: POSITIVE
NRBC AUTOMATED: ABNORMAL PER 100 WBC
OTHER OBSERVATIONS UA: ABNORMAL
PDW BLD-RTO: 13.3 % (ref 12.5–15.4)
PH UA: 7 (ref 5–8)
PLATELET # BLD: 408 K/UL (ref 140–450)
PLATELET ESTIMATE: ABNORMAL
PMV BLD AUTO: 7.1 FL (ref 6–12)
POTASSIUM SERPL-SCNC: 4.3 MMOL/L (ref 3.7–5.3)
PROTEIN UA: ABNORMAL
RBC # BLD: 4.26 M/UL (ref 4–5.2)
RBC # BLD: ABNORMAL 10*6/UL
RBC UA: ABNORMAL /HPF (ref 0–2)
RENAL EPITHELIAL, UA: ABNORMAL /HPF
SEG NEUTROPHILS: 61 % (ref 36–66)
SEGMENTED NEUTROPHILS ABSOLUTE COUNT: 4.56 K/UL (ref 1.8–7.7)
SODIUM BLD-SCNC: 147 MMOL/L (ref 135–144)
SPECIFIC GRAVITY UA: 1.02 (ref 1–1.03)
TOTAL PROTEIN: 8.2 G/DL (ref 6.4–8.3)
TRICHOMONAS: ABNORMAL
TURBIDITY: ABNORMAL
URINE HGB: NEGATIVE
UROBILINOGEN, URINE: NORMAL
VALPROIC ACID LEVEL: 102 UG/ML (ref 50–125)
VALPROIC DATE LAST DOSE: NORMAL
VALPROIC DOSE AMOUNT: NORMAL
VALPROIC TIME LAST DOSE: NORMAL
WBC # BLD: 7.5 K/UL (ref 3.5–11)
WBC # BLD: ABNORMAL 10*3/UL
WBC UA: ABNORMAL /HPF (ref 0–5)
YEAST: ABNORMAL

## 2020-12-14 PROCEDURE — 85025 COMPLETE CBC W/AUTO DIFF WBC: CPT

## 2020-12-14 PROCEDURE — 80164 ASSAY DIPROPYLACETIC ACD TOT: CPT

## 2020-12-14 PROCEDURE — 99285 EMERGENCY DEPT VISIT HI MDM: CPT

## 2020-12-14 PROCEDURE — 6360000002 HC RX W HCPCS: Performed by: EMERGENCY MEDICINE

## 2020-12-14 PROCEDURE — 71045 X-RAY EXAM CHEST 1 VIEW: CPT

## 2020-12-14 PROCEDURE — 87088 URINE BACTERIA CULTURE: CPT

## 2020-12-14 PROCEDURE — 81001 URINALYSIS AUTO W/SCOPE: CPT

## 2020-12-14 PROCEDURE — 2580000003 HC RX 258: Performed by: EMERGENCY MEDICINE

## 2020-12-14 PROCEDURE — 80053 COMPREHEN METABOLIC PANEL: CPT

## 2020-12-14 PROCEDURE — 87086 URINE CULTURE/COLONY COUNT: CPT

## 2020-12-14 PROCEDURE — 93005 ELECTROCARDIOGRAM TRACING: CPT | Performed by: EMERGENCY MEDICINE

## 2020-12-14 PROCEDURE — 96374 THER/PROPH/DIAG INJ IV PUSH: CPT

## 2020-12-14 PROCEDURE — 36415 COLL VENOUS BLD VENIPUNCTURE: CPT

## 2020-12-14 PROCEDURE — 87186 SC STD MICRODIL/AGAR DIL: CPT

## 2020-12-14 RX ORDER — CEPHALEXIN 500 MG/1
500 CAPSULE ORAL 2 TIMES DAILY
Qty: 14 CAPSULE | Refills: 0 | Status: SHIPPED | OUTPATIENT
Start: 2020-12-14 | End: 2020-12-21

## 2020-12-14 RX ORDER — DIVALPROEX SODIUM 125 MG/1
125 CAPSULE, COATED PELLETS ORAL 2 TIMES DAILY
COMMUNITY

## 2020-12-14 RX ORDER — CEPHALEXIN 250 MG/1
500 CAPSULE ORAL ONCE
Status: DISCONTINUED | OUTPATIENT
Start: 2020-12-14 | End: 2020-12-14

## 2020-12-14 RX ADMIN — CEFTRIAXONE SODIUM 1 G: 1 INJECTION, POWDER, FOR SOLUTION INTRAMUSCULAR; INTRAVENOUS at 21:17

## 2020-12-15 NOTE — ED NOTES
Pt. To room # 8 via ems. Per ems pt. From [facility] and was called for altered mental status with brief non responsiveness. Per ems staff states pt eyes rolled and non verbal with eyes closing. Per ems pt. Back to baseline. Pt. Verbal, but unable to understand. Pt. Is moving extremities. Pt. Moved over to cart and placed on cardiac monitor, bp cuff, and pulse ox. Rails up x2 and call light within reach. Dr Xin Strange at  Bedside.       Yue Dejesus, JIMMY  12/14/20 6044

## 2020-12-15 NOTE — ED NOTES
Pt. Resting on cart. RR equal and non labored. NaD noted. Will continue to monitor.      Anika oLpez RN  12/14/20 2055

## 2020-12-15 NOTE — ED NOTES
Pt. Resting on cart. RR equal and non labored. NaD noted. Pt. Responds to verbal stimlu and mumbles and swings at staff only when touched. Will continue to monitor. Staff at [facility] to  pt at 2200.  [facility] staff 30 minutes ago on poc.      Kecia Rouse RN  12/14/20 8320

## 2020-12-15 NOTE — ED NOTES
Writer trying to give keflex powder in apple sauce. Pt. [facility] and will not drink or take applesauce.      Suki Cunningham RN  12/14/20 0597

## 2020-12-15 NOTE — ED PROVIDER NOTES
[facility]  [address] [facility]  Phone: [phone]      Pt Name: July Sawyer  [facility]:[id number]  [place] 1950  Date of evaluation: 12/14/2020      CHIEF COMPLAINT       Chief Complaint   Patient presents with    Altered Mental Status       HISTORY OF PRESENT ILLNESS   July Sawyer is a 65 y.o. female with history of schizophrenia, seizures, bipolar disorder, and DD who presents for evaluation of a transient episode of unresponsiveness. The patient is unable to provide any history secondary to her developmental disabilities. History from EMS is limited. EMS reports that they were called to the patient's group home just prior to arrival for complaint of respiratory issues. Upon arrival the staff told EMS that the patient was not having respiratory issues but she had a brief episode where she would not respond or open her eyes when the patient's aide called her name. The patient is now at her baseline. She only speaks a few words that are unintelligible according to the group home staff. EMS states that the patient did not have any episodes of hypoxia and has not indicated any pain. She has been awake and tracking with eyes during transport. The patient tested negative for COVID-19 yesterday. There are people at her group home that have tested positive for COVID-19. Everyone in the group home is currently being isolated at this time. The patient has not had any reported fevers, seizures, injury or illness. It is unclear if how long the patient may have been unresponsive or when she was last checked on.     REVIEW OF SYSTEMS     Unable to perform review of systems secondary to the patient's developmental disability    PAST MEDICAL HISTORY has a past medical history of Allergic rhinitis, Bipolar affective ([address] Constipation, Contracture of muscle of multiple sites, Dermatomyositis ([facility] [address] Dysphagia, Hypercholesterolemia, Hypertension, Mental retardation, profound (I.Q. < 20), Neuroleptic-induced tardive dyskinesia, PTSD (post-traumatic stress disorder), Pyloric ulcer, and Schizophrenia ([facility] [address] SURGICAL HISTORY      has a past surgical history that includes Abdomen surgery; Colonoscopy (12/13/2017); pr colon ca scrn not hi rsk ind (N/A, 12/13/2017); Colonoscopy (02/08/2018); and pr colon ca scrn not hi rsk ind (N/A, 2/8/2018). CURRENT MEDICATIONS       Previous Medications    ACETAMINOPHEN (TYLENOL) 160 MG/5ML LIQUID    Take 15 mg/kg by mouth every 4 hours as needed for Fever 20 ML Q 4 HRS    ACETAMINOPHEN (TYLENOL) 325 MG TABLET    Take 650 mg by mouth every 4 hours as needed for Pain 2 Q 4 HRS    AMLODIPINE (NORVASC) 5 MG TABLET    Take 1 tablet by mouth daily    ATORVASTATIN (LIPITOR) 40 MG TABLET    Take 1 tablet by mouth daily    BISACODYL (DULCOLAX) 5 MG EC TABLET    Take 5 mg by mouth as needed for Constipation 1 EVERY 2 DAYS AS NEEDED    CHOLECALCIFEROL (VITAMIN D) 2000 UNITS TABS TABLET    Take 1 tablet by mouth daily    DEXTROMETHORPHAN-GUAIFENESIN  MG/5ML SYRP    Take 5 mLs by mouth every 4 hours as needed for Cough    DIPHENHYDRAMINE (BENADRYL) 25 MG CAPSULE    Take 25 mg by mouth every 6 hours as needed for Itching or Allergies    DIVALPROEX (DEPAKOTE SPRINKLES) 125 MG CAPSULE    Take 125 mg by mouth 2 times daily Give 5 capsules by mouth twice daily    FAMOTIDINE (PEPCID) 20 MG TABLET    Take 20 mg by mouth 2 times daily    GUAIFENESIN (MUCINEX) 600 MG EXTENDED RELEASE TABLET    Take 600 mg by mouth 2 times daily as needed for Congestion    HYDROCORTISONE (ANUSOL-HC) 2.5 % RECTAL CREAM    Place rectally 2 times daily Place rectally 2 times daily. IBUPROFEN (ADVIL;MOTRIN) 200 MG TABLET    Take 200 mg by mouth every 6 hours as needed for Pain    IPRATROPIUM (ATROVENT) 0.03 % NASAL SPRAY    2 sprays by Nasal route 2 times daily    MAGNESIUM HYDROXIDE (MILK OF MAGNESIA) 400 MG/5ML SUSPENSION    Take 30 mLs by mouth daily as needed for Constipation    MULTIPLE VITAMINS-MINERALS (MULTIVITAMIN PO)    Take by mouth daily    NUTRITIONAL SUPPLEMENTS (BOOST) LIQD    2 bottles/cans a day    POLYETHYLENE GLYCOL (GLYCOLAX) PACKET    Take 17 g by mouth daily as needed    PROPRANOLOL (INDERAL) 60 MG TABLET    Take 120 mg by mouth 2 times daily BID    PROPYLENE GLYCOL (SYSTANE COMPLETE OP)    Apply to eye    QUETIAPINE (SEROQUEL) 300 MG TABLET    Take 300 mg by mouth every morning     QUETIAPINE (SEROQUEL) 400 MG TABLET    Take 400 mg by mouth daily    SACCHAROMYCES BOULARDII (PROBIOTIC) 250 MG CAPS    Take by mouth    SENNA (SENOKOT) 8.6 MG TABS TABLET    Take 1 tablet by mouth 2 times daily    TRAZODONE (DESYREL) 100 MG TABLET    Take 100 mg by mouth nightly    TRIAMCINOLONE (KENALOG) 0.1 % OINTMENT    Apply topically 2 times daily Apply topically 2 times daily. TRIHEXYPHENIDYL (ARTANE) 2 MG TABLET    Take 2 mg by mouth nightly    VALPROATE (DEPAKENE) 250 MG/5ML SOLUTION    Take 25 mLs by mouth nightly       ALLERGIES     is allergic to augmentin [amoxicillin-pot clavulanate]; cogentin [benztropine]; and methotrexate derivatives. FAMILY HISTORY     She indicated that her mother is alive. family history is not on file. SOCIAL HISTORY      reports that she has never smoked. She has never used smokeless tobacco. She reports that she does not drink alcohol or use drugs. I counseled the patient against using tobacco products. PHYSICAL EXAM     INITIAL VITALS:  temporal temperature is 98.2 °F (36.8 °C). Her blood pressure is 100/63 and her pulse is 77. Her respiration is 16 and oxygen saturation is 94%.      CONSTITUTIONAL: no apparent distress, nontoxic SKIN: warm, dry, no jaundice, hives or petechiae, no signs of skin breakdown in the sacral region  EYES: clear conjunctiva, non-icteric sclera  HENT: normocephalic, atraumatic, moist mucus membranes  NECK: Nontender and supple with no nuchal rigidity  PULMONARY: clear to auscultation without wheezes, rhonchi, or rales, normal excursion, no accessory muscle use and no stridor  CARDIOVASCULAR: regular rate, rhythm. Strong radial pulses with intact distal perfusion. Capillary refill <2 seconds. GASTROINTESTINAL: soft, non-tender, non-distended, well-healed midline abdominal scar, no rebound or guarding, no pain or McBurney's point, negative Patrick sign  GENITOURINARY: No costovertebral angle tenderness to palpation  MUSCULOSKELETAL: No midline spinal tenderness, step off or deformity. Extremities are otherwise nontender to palpation and nonerythematous. Compartments soft. No peripheral edema. NEUROLOGIC: Awake, tracks with eyes, does not follow commands, at baseline per EMS, moves all extremities, nonverbal    DIAGNOSTIC RESULTS     EKG:  EKG 2001 sinus rhythm, normal axis, normal intervals, no ST elevation or depression, no T wave inversions, poor R wave progression, Q wave in 2, 3 and aVF, no change from EKG on 6/24/2020    RADIOLOGY:   Xr Chest Portable    Result Date: 12/14/2020  EXAMINATION: ONE XRAY VIEW OF THE CHEST 12/14/2020 8:26 pm COMPARISON: 06/24/2020 HISTORY: ORDERING SYSTEM PROVIDED HISTORY: altered mental status TECHNOLOGIST PROVIDED HISTORY: altered mental status Reason for Exam: altered mental status Acuity: Acute Type of Exam: Initial Additional signs and symptoms: NA Relevant Medical/Surgical History: hypertension FINDINGS: The lungs are clear. The cardiac and mediastinal contours are normal.  There is no pleural effusion or pneumothorax. No acute osseous abnormality is identified. There are stable degenerative changes of the right shoulder. No acute cardiopulmonary abnormality.        LABS: Results for orders placed or performed during the hospital encounter of 12/14/20   CBC Auto Differential   Result Value Ref Range    WBC 7.5 3.5 - 11.0 k/uL    RBC 4.26 4.0 - 5.2 m/uL    Hemoglobin 12.5 12.0 - 16.0 g/dL    Hematocrit 39.4 36 - 46 %    MCV 92.4 80 - 100 fL    MCH 29.3 26 - 34 pg    MCHC 31.7 31 - 37 g/dL    RDW 13.3 12.5 - 15.4 %    Platelets 913 779 - 045 k/uL    MPV 7.1 6.0 - 12.0 fL    NRBC Automated NOT REPORTED per 100 WBC    Differential Type NOT REPORTED     Immature Granulocytes NOT REPORTED 0 %    Absolute Immature Granulocyte NOT REPORTED 0.00 - 0.30 k/uL    WBC Morphology NOT REPORTED     RBC Morphology NOT REPORTED     Platelet Estimate NOT REPORTED     Seg Neutrophils 61 36 - 66 %    Lymphocytes 23 (L) 24 - 44 %    Monocytes 13 (H) 1 - 7 %    Eosinophils % 3 1 - 4 %    Basophils 0 0 - 2 %    Segs Absolute 4.56 1.8 - 7.7 k/uL    Absolute Lymph # 1.73 1.0 - 4.8 k/uL    Absolute Mono # 0.98 (H) 0.1 - 0.8 k/uL    Absolute Eos # 0.23 0.0 - 0.4 k/uL    Basophils Absolute 0.00 0.0 - 0.2 k/uL    Morphology Normal    Comprehensive Metabolic Panel w/ Reflex to MG   Result Value Ref Range    Glucose 126 (H) 70 - 99 mg/dL    BUN 15 8 - 23 mg/dL    CREATININE 0.50 0.50 - 0.90 mg/dL    Bun/Cre Ratio NOT REPORTED 9 - 20    Calcium 9.0 8.6 - 10.4 mg/dL    Sodium 147 (H) 135 - 144 mmol/L    Potassium 4.3 3.7 - 5.3 mmol/L    Chloride 111 (H) 98 - 107 mmol/L    CO2 25 20 - 31 mmol/L    Anion Gap 11 9 - 17 mmol/L    Alkaline Phosphatase 66 35 - 104 U/L    ALT 6 5 - 33 U/L    AST 31 <32 U/L    Total Bilirubin 0.30 0.3 - 1.2 mg/dL    Total Protein 8.2 6.4 - 8.3 g/dL    Alb 2.8 (L) 3.5 - 5.2 g/dL    Albumin/Globulin Ratio 0.5 (L) 1.0 - 2.5    GFR Non-African American >60 >60 mL/min    GFR African American >60 >60 mL/min    GFR Comment          GFR Staging NOT REPORTED    Urinalysis with Microscopic   Result Value Ref Range    Color, UA YELLOW YELLOW    Turbidity UA CLOUDY (A) CLEAR Glucose, Ur NEGATIVE NEGATIVE    Bilirubin Urine NEGATIVE  Verified by ictotest. (A) NEGATIVE    Ketones, Urine TRACE (A) NEGATIVE    Specific Gravity, UA 1.020 1.005 - 1.030    Urine Hgb NEGATIVE NEGATIVE    pH, UA 7.0 5.0 - 8.0    Protein, UA TRACE (A) NEGATIVE    Urobilinogen, Urine Normal Normal    Nitrite, Urine POSITIVE (A) NEGATIVE    Leukocyte Esterase, Urine SMALL (A) NEGATIVE    Urinalysis Comments NOT REPORTED     -          WBC, UA 10 TO 20 0 - 5 /HPF    RBC, UA None 0 - 2 /HPF    Casts UA NOT REPORTED 0 - 2 /LPF    Crystals, UA NOT REPORTED None /HPF    Epithelial Cells UA None 0 - 5 /HPF    Renal Epithelial, UA NOT REPORTED 0 /HPF    Bacteria, UA MANY (A) None    Mucus, UA NOT REPORTED None    Trichomonas, UA NOT REPORTED None    Amorphous, UA NOT REPORTED None    Other Observations UA NOT REPORTED NOT REQ. Yeast, UA NOT REPORTED None   Valproic acid level, total   Result Value Ref Range    Valproic Acid Lvl 102 50 - 125 ug/mL    Valproic Dose amount NOT REPORTED     Valproic Date last dose NOT REPORTED     Valproic Time last dose NOT REPORTED        EMERGENCY DEPARTMENT COURSE:     ED Course as of Dec 14 2114   Mon Dec 14, 2020   2105 I spoke to the nurse manager at the patient's group home and reviewed what happened earlier tonight. She states that the patient had a few seconds of being unresponsive when a staff member tried to talk to her and she would not open her eyes. Afterwards the patient was at her baseline. The patient has not had any upper respiratory symptoms but there are other members of the group home who tested positive for Covid. The patient tested negative for Covid 3 days ago. The patient has been isolated in her room.   We discussed plan for discharge and the nurse manager states that she will send a staff member to the emergency department to drive her home.    [AM]      ED Course User Index  [AM] Hayley Bragg DO       The patient was given the following medications: Orders Placed This Encounter   Medications    DISCONTD: cephALEXin (KEFLEX) capsule 500 mg     Order Specific Question:   Antimicrobial Indications     Answer:   Urinary Tract Infection    cephALEXin (KEFLEX) 500 MG capsule     Sig: Take 1 capsule by mouth 2 times daily for 7 days     Dispense:  14 capsule     Refill:  0    cefTRIAXone (ROCEPHIN) 1 g in sterile water 10 mL IV syringe     Order Specific Question:   Antimicrobial Indications     Answer:   Urinary Tract Infection        Vitals:    Vitals:    12/14/20 1955 12/14/20 2054   BP: 135/75 100/63   Pulse: 82 77   Resp: 16 16   Temp: 98.2 °F (36.8 °C)    TempSrc: Temporal    SpO2: 96% 94%     -------------------------  BP: 100/63, Temp: 98.2 °F (36.8 °C), Pulse: 77, Resp: 16    CONSULTS:  None    CRITICAL CARE:   None    PROCEDURES:  None    DIAGNOSIS/ MDM: Toribio Roberts MD  [facility] [address]  [address]  [phone]    Schedule an appointment as soon as possible for a visit in 2 days      [facility]  [address] [facility] [address] [phone]  Go to   If symptoms worsen      DISCHARGE MEDICATIONS:  New Prescriptions    CEPHALEXIN (KEFLEX) 500 MG CAPSULE    Take 1 capsule by mouth 2 times daily for 7 days       (Please note that portions of this note were completed with a voice recognitionprogram.  Efforts were made to edit the dictations but occasionally words are mis-transcribed.)    [address]  Emergency Physician Attending         Eli Amador,   12/14/20 6750

## 2020-12-15 NOTE — ED NOTES
Caregiver at bedside and discharge papers and rx given. Pt. Transferred to wheelchair with assist x3.       Suki Cunningham RN  12/14/20 4414

## 2020-12-16 LAB
CULTURE: ABNORMAL
EKG ATRIAL RATE: 83 BPM
EKG P AXIS: 60 DEGREES
EKG P-R INTERVAL: 142 MS
EKG Q-T INTERVAL: 310 MS
EKG QRS DURATION: 72 MS
EKG QTC CALCULATION (BAZETT): 364 MS
EKG R AXIS: -7 DEGREES
EKG T AXIS: 63 DEGREES
EKG VENTRICULAR RATE: 83 BPM
Lab: ABNORMAL
SPECIMEN DESCRIPTION: ABNORMAL

## 2021-01-05 ENCOUNTER — OFFICE VISIT (OUTPATIENT)
Dept: PRIMARY CARE CLINIC | Age: 71
End: 2021-01-05
Payer: MEDICARE

## 2021-01-05 ENCOUNTER — HOSPITAL ENCOUNTER (OUTPATIENT)
Age: 71
Setting detail: SPECIMEN
Discharge: HOME | End: 2021-01-05
Payer: MEDICARE

## 2021-01-05 VITALS
BODY MASS INDEX: 26.13 KG/M2 | TEMPERATURE: 97.2 F | RESPIRATION RATE: 16 BRPM | HEIGHT: 65 IN | SYSTOLIC BLOOD PRESSURE: 96 MMHG | DIASTOLIC BLOOD PRESSURE: 62 MMHG

## 2021-01-05 DIAGNOSIS — Z00.00 ROUTINE GENERAL MEDICAL EXAMINATION AT A HEALTH CARE FACILITY: ICD-10-CM

## 2021-01-05 DIAGNOSIS — E55.9 VITAMIN D DEFICIENCY: ICD-10-CM

## 2021-01-05 DIAGNOSIS — R73.9 HYPERGLYCEMIA: ICD-10-CM

## 2021-01-05 DIAGNOSIS — Z00.00 ROUTINE GENERAL MEDICAL EXAMINATION AT A HEALTH CARE FACILITY: Primary | ICD-10-CM

## 2021-01-05 DIAGNOSIS — E78.5 HYPERLIPIDEMIA, UNSPECIFIED HYPERLIPIDEMIA TYPE: ICD-10-CM

## 2021-01-05 LAB
ABSOLUTE EOS #: 0.17 K/UL (ref 0–0.44)
ABSOLUTE IMMATURE GRANULOCYTE: 0.06 K/UL (ref 0–0.3)
ABSOLUTE LYMPH #: 2.4 K/UL (ref 1.1–3.7)
ABSOLUTE MONO #: 0.99 K/UL (ref 0.1–1.2)
ALBUMIN SERPL-MCNC: 3.2 G/DL (ref 3.5–5.2)
ALBUMIN/GLOBULIN RATIO: 0.7 (ref 1–2.5)
ALP BLD-CCNC: 74 U/L (ref 35–104)
ALT SERPL-CCNC: 5 U/L (ref 5–33)
ANION GAP SERPL CALCULATED.3IONS-SCNC: 12 MMOL/L (ref 9–17)
AST SERPL-CCNC: 20 U/L
BASOPHILS # BLD: 0 % (ref 0–2)
BASOPHILS ABSOLUTE: 0.03 K/UL (ref 0–0.2)
BILIRUB SERPL-MCNC: 0.26 MG/DL (ref 0.3–1.2)
BUN BLDV-MCNC: 10 MG/DL (ref 8–23)
BUN/CREAT BLD: ABNORMAL (ref 9–20)
CALCIUM SERPL-MCNC: 9.3 MG/DL (ref 8.6–10.4)
CHLORIDE BLD-SCNC: 101 MMOL/L (ref 98–107)
CHOLESTEROL/HDL RATIO: 3.1
CHOLESTEROL: 125 MG/DL
CO2: 26 MMOL/L (ref 20–31)
CREAT SERPL-MCNC: 0.53 MG/DL (ref 0.5–0.9)
DIFFERENTIAL TYPE: ABNORMAL
EOSINOPHILS RELATIVE PERCENT: 2 % (ref 1–4)
ESTIMATED AVERAGE GLUCOSE: 103 MG/DL
FOLATE: 8.1 NG/ML
GFR AFRICAN AMERICAN: >60 ML/MIN
GFR NON-AFRICAN AMERICAN: >60 ML/MIN
GFR SERPL CREATININE-BSD FRML MDRD: ABNORMAL ML/MIN/{1.73_M2}
GFR SERPL CREATININE-BSD FRML MDRD: ABNORMAL ML/MIN/{1.73_M2}
GLUCOSE BLD-MCNC: 107 MG/DL (ref 70–99)
HBA1C MFR BLD: 5.2 % (ref 4–6)
HCT VFR BLD CALC: 43.1 % (ref 36.3–47.1)
HDLC SERPL-MCNC: 40 MG/DL
HEMOGLOBIN: 13.5 G/DL (ref 11.9–15.1)
IMMATURE GRANULOCYTES: 1 %
LDL CHOLESTEROL: 64 MG/DL (ref 0–130)
LYMPHOCYTES # BLD: 27 % (ref 24–43)
MCH RBC QN AUTO: 29.9 PG (ref 25.2–33.5)
MCHC RBC AUTO-ENTMCNC: 31.3 G/DL (ref 28.4–34.8)
MCV RBC AUTO: 95.6 FL (ref 82.6–102.9)
MONOCYTES # BLD: 11 % (ref 3–12)
NRBC AUTOMATED: 0 PER 100 WBC
PDW BLD-RTO: 14.2 % (ref 11.8–14.4)
PLATELET # BLD: 282 K/UL (ref 138–453)
PLATELET ESTIMATE: ABNORMAL
PMV BLD AUTO: 9.5 FL (ref 8.1–13.5)
POTASSIUM SERPL-SCNC: 4.2 MMOL/L (ref 3.7–5.3)
RBC # BLD: 4.51 M/UL (ref 3.95–5.11)
RBC # BLD: ABNORMAL 10*6/UL
SEG NEUTROPHILS: 59 % (ref 36–65)
SEGMENTED NEUTROPHILS ABSOLUTE COUNT: 5.1 K/UL (ref 1.5–8.1)
SODIUM BLD-SCNC: 139 MMOL/L (ref 135–144)
TOTAL PROTEIN: 8 G/DL (ref 6.4–8.3)
TRIGL SERPL-MCNC: 103 MG/DL
TSH SERPL DL<=0.05 MIU/L-ACNC: 7.38 MIU/L (ref 0.3–5)
VITAMIN B-12: >2000 PG/ML (ref 232–1245)
VITAMIN D 25-HYDROXY: 72.6 NG/ML (ref 30–100)
VLDLC SERPL CALC-MCNC: ABNORMAL MG/DL (ref 1–30)
WBC # BLD: 8.8 K/UL (ref 3.5–11.3)
WBC # BLD: ABNORMAL 10*3/UL

## 2021-01-05 PROCEDURE — 1123F ACP DISCUSS/DSCN MKR DOCD: CPT | Performed by: INTERNAL MEDICINE

## 2021-01-05 PROCEDURE — G0439 PPPS, SUBSEQ VISIT: HCPCS | Performed by: INTERNAL MEDICINE

## 2021-01-05 PROCEDURE — G8484 FLU IMMUNIZE NO ADMIN: HCPCS | Performed by: INTERNAL MEDICINE

## 2021-01-05 PROCEDURE — 4040F PNEUMOC VAC/ADMIN/RCVD: CPT | Performed by: INTERNAL MEDICINE

## 2021-01-05 PROCEDURE — 3017F COLORECTAL CA SCREEN DOC REV: CPT | Performed by: INTERNAL MEDICINE

## 2021-01-05 NOTE — PATIENT INSTRUCTIONS
Personalized Preventive Plan for Lilly Mckinley - 1/5/2021  Medicare offers a range of preventive health benefits. Some of the tests and screenings are paid in full while other may be subject to a deductible, co-insurance, and/or copay. Some of these benefits include a comprehensive review of your medical history including lifestyle, illnesses that may run in your family, and various assessments and screenings as appropriate. After reviewing your medical record and screening and assessments performed today your provider may have ordered immunizations, labs, imaging, and/or referrals for you. A list of these orders (if applicable) as well as your Preventive Care list are included within your After Visit Summary for your review. Other Preventive Recommendations:    · A preventive eye exam performed by an eye specialist is recommended every 1-2 years to screen for glaucoma; cataracts, macular degeneration, and other eye disorders. · A preventive dental visit is recommended every 6 months. · Try to get at least 150 minutes of exercise per week or 10,000 steps per day on a pedometer . · Order or download the FREE \"Exercise & Physical Activity: Your Everyday Guide\" from [employer]. Call 5-976.687.3448 or search [employer] online. · You need 6225-1342 mg of calcium and 4530-2997 IU of vitamin D per day. It is possible to meet your calcium requirement with diet alone, but a vitamin D supplement is usually necessary to meet this goal.  · When exposed to the sun, use a sunscreen that protects against both UVA and UVB radiation with an SPF of 30 or greater. Reapply every 2 to 3 hours or after sweating, drying off with a towel, or swimming. · Always wear a seat belt when traveling in a car. Always wear a helmet when riding a bicycle or motorcycle.

## 2021-01-05 NOTE — PROGRESS NOTES
Medicare Annual Wellness Visit  Name: Pee Ross Date: 2021   MRN: [id number] Sex: Female   Age: 65 y.o. Ethnicity: Non-/Non    : 1950 Race: Black      Dayana Rich is here for Medicare AWV    Screenings for behavioral, psychosocial and functional/safety risks, and cognitive dysfunction are all negative except as indicated below. These results, as well as other patient data from the [address] form, are documented in Flowsheets linked to this Encounter. Allergies   Allergen Reactions    Augmentin [Amoxicillin-Pot Clavulanate]     Cogentin [Benztropine]     Methotrexate Derivatives        Prior to Visit Medications    Medication Sig Taking? Authorizing Provider   divalproex (DEPAKOTE SPRINKLES) 125 MG capsule Take 125 mg by mouth 2 times daily Give 5 capsules by mouth twice daily Yes Historical Provider, MD   triamcinolone (KENALOG) 0.1 % ointment Apply topically 2 times daily Apply topically 2 times daily. Yes Historical Provider, MD   Propylene Glycol (SYSTANE COMPLETE OP) Apply to eye Yes Historical Provider, MD   Cholecalciferol (VITAMIN D) 2000 units TABS tablet Take 1 tablet by mouth daily Yes Diogo Ontiveros MD   Nutritional Supplements (BOOST) LIQD 2 bottles/cans a day Yes Diogo Ontiveros MD   atorvastatin (LIPITOR) 40 MG tablet Take 1 tablet by mouth daily Yes Diogo Ontiveros MD   amLODIPine (NORVASC) 5 MG tablet Take 1 tablet by mouth daily Yes Diogo Ontiveros MD   valproate (DEPAKENE) 250 MG/5ML solution Take 25 mLs by mouth nightly Yes Diogo Ontiveros MD   hydrocortisone (ANUSOL-HC) 2.5 % rectal cream Place rectally 2 times daily Place rectally 2 times daily.  Yes Historical Provider, MD   Dextromethorphan-guaiFENesin  MG/5ML SYRP Take 5 mLs by mouth every 4 hours as needed for Cough Yes Historical Provider, MD   traZODone (DESYREL) 100 MG tablet Take 100 mg by mouth nightly Yes Historical Provider, MD diphenhydrAMINE (BENADRYL) 25 MG capsule Take 25 mg by mouth every 6 hours as needed for Itching or Allergies Yes Historical Provider, MD   ibuprofen (ADVIL;MOTRIN) 200 MG tablet Take 200 mg by mouth every 6 hours as needed for Pain Yes Historical Provider, MD   magnesium hydroxide (MILK OF MAGNESIA) 400 MG/5ML suspension Take 30 mLs by mouth daily as needed for Constipation Yes Historical Provider, MD   guaiFENesin (MUCINEX) 600 MG extended release tablet Take 600 mg by mouth 2 times daily as needed for Congestion Yes Historical Provider, MD   Saccharomyces boulardii (PROBIOTIC) 250 MG CAPS Take by mouth Yes Historical Provider, MD   famotidine (PEPCID) 20 MG tablet Take 20 mg by mouth 2 times daily Yes Historical Provider, MD   ipratropium (ATROVENT) 0.03 % nasal spray 2 sprays by Nasal route 2 times daily Yes Historical Provider, MD   Multiple Vitamins-Minerals (MULTIVITAMIN PO) Take by mouth daily Yes Historical Provider, MD   polyethylene glycol (GLYCOLAX) packet Take 17 g by mouth daily as needed Yes Historical Provider, MD   propranolol (INDERAL) 60 MG tablet Take 120 mg by mouth 2 times daily BID Yes Historical Provider, MD   QUEtiapine (SEROQUEL) 300 MG tablet Take 300 mg by mouth every morning  Yes Historical Provider, MD   QUEtiapine (SEROQUEL) 400 MG tablet Take 400 mg by mouth daily Yes Historical Provider, MD   senna (SENOKOT) 8.6 MG TABS tablet Take 1 tablet by mouth 2 times daily Yes Historical Provider, MD   trihexyphenidyl (ARTANE) 2 MG tablet Take 2 mg by mouth nightly Yes Historical Provider, MD   acetaminophen (TYLENOL) 160 MG/5ML liquid Take 15 mg/kg by mouth every 4 hours as needed for Fever 20 ML Q 4 HRS Yes Historical Provider, MD   acetaminophen (TYLENOL) 325 MG tablet Take 650 mg by mouth every 4 hours as needed for Pain 2 Q 4 HRS Yes Historical Provider, MD bisacodyl (DULCOLAX) 5 MG EC tablet Take 5 mg by mouth as needed for Constipation 1 EVERY 2 DAYS AS NEEDED Yes Historical Provider, MD       Past Medical History:   Diagnosis Date    Allergic rhinitis     [facility]     Constipation     Contracture of muscle of multiple sites     bilateral upper extremities.  Dermatomyositis ([facility]     Dysphagia     Hypercholesterolemia     Hypertension     Mental retardation, profound (I.Q. < 20)     Neuroleptic-induced tardive dyskinesia     PTSD (post-traumatic stress disorder)     Pyloric ulcer     w stenosis    Schizophrenia ([facility]        Past Surgical History:   Procedure Laterality Date    ABDOMEN SURGERY      pyloric ulcer surgery    COLONOSCOPY  12/13/2017    attempted-poor prep    COLONOSCOPY  02/08/2018    suboptimal prep; redundant colon; small internal hemorrhoids    SD COLON CA SCRN NOT HI RSK IND N/A 12/13/2017    COLONOSCOPY, ATTEMPTED / POOR -PREP performed by Marilyn Rodriguez MD at [address] [facility] NOT  [address] IND N/A 2/8/2018    COLONOSCOPY performed by Marilyn Rodriguez MD at [facility]       No family history on file. CareTeam (Including outside providers/suppliers regularly involved in providing care):   Patient Care Team:  Kailee Gregory MD as PCP - General (Internal Medicine)  Kailee Gregory MD as PCP - [address] [place] [address] Dr Railed Provider    Wt Readings from Last 3 Encounters:   10/27/20 157 lb (71.2 kg)   06/24/20 176 lb (79.8 kg)   06/17/20 176 lb (79.8 kg)     Vitals:    01/05/21 1051   BP: 96/62   Resp: 16   Temp: 97.2 °F (36.2 °C)   TempSrc: Temporal   Height: 5' 5\" (1.651 m)     Body mass index is 26.13 kg/m². Based upon direct observation of the patient, evaluation of cognition reveals could not perform.     General Appearance: alert and oriented to person, place and time, well developed and well- nourished, in no acute distress  Skin: warm and dry, no rash or erythema  Head: normocephalic and atraumatic Eyes: pupils equal, round, and reactive to light, extraocular eye movements intact, conjunctivae normal  ENT: tympanic membrane, external ear and ear canal normal bilaterally, nose without deformity, nasal mucosa and turbinates normal without polyps  Neck: supple and non-tender without mass, no thyromegaly or thyroid nodules, no cervical lymphadenopathy  Pulmonary/Chest: clear to auscultation bilaterally- no wheezes, rales or rhonchi, normal air movement, no respiratory distress  Cardiovascular: normal rate, regular rhythm, normal S1 and S2, no murmurs, rubs, clicks, or gallops, distal pulses intact, no carotid bruits  Abdomen: soft, non-tender, non-distended, normal bowel sounds, no masses or organomegaly  Extremities: no cyanosis, clubbing or edema  Musculoskeletal: normal range of motion, no joint swelling, deformity or tenderness  Neurologic: reflexes normal and symmetric, no cranial nerve deficit, gait, coordination and speech normal    Patient's complete Health Risk Assessment and screening values have been reviewed and are found in Flowsheets. The following problems were reviewed today and where indicated follow up appointments were made and/or referrals ordered.     Positive Risk Factor Screenings with Interventions:          General Health and ACP:     Advance Directives     Power of  Living Will ACP-Advance Directive ACP-Power of     Not on File Not on File Not on File Filed      General Health Risk Interventions:  · Poor self-assessment of health status: patient declines any further evaluation/treatment for this issue        Personalized Preventive Plan   Current Health Maintenance Status  Immunization History   Administered Date(s) Administered    DTaP 04/17/2015    Hepatitis B (Engerix-B) 09/29/1983, 02/28/1984, 11/29/1984    Hepatitis B (Recombivax HB) 11/29/1984, 06/14/1986, 07/18/1986    Influenza Vaccine, unspecified formulation 11/03/2014, 10/01/2016    PPD Test 05/20/2015  Pneumococcal Conjugate 13-valent ([id number]) 03/22/2014, 11/21/2016    Pneumococcal Polysaccharide ([id number]) 08/23/2018    Zoster Live (Zostavax) 01/19/2015        Health Maintenance   Topic Date Due    Hepatitis C screen  1950    Shingles Vaccine (2 of 3) 03/16/2015    Annual Wellness Visit (AWV)  05/29/2019    Colon cancer screen colonoscopy  02/08/2020    Lipid screen  11/27/2020    Breast cancer screen  07/24/2021    Potassium monitoring  12/14/2021    Creatinine monitoring  12/14/2021    DTaP/Tdap/Td vaccine (2 - Tdap) 04/17/2025    DEXA (modify frequency per FRAX score)  Completed    Flu vaccine  Completed    Pneumococcal 65+ years Vaccine  Completed    Hepatitis A vaccine  Aged Out    Hepatitis B vaccine  Aged Out    Hib vaccine  Aged Out    Meningococcal (ACWY) vaccine  Aged Out     Recommendations for [employer] Due: see orders and patient instructions/AVS.  . Recommended screening schedule for the next 5-10 years is provided to the patient in written form: see Patient Odalys Carrera was seen today for medicare awv.     Diagnoses and all orders for this visit:    Routine general medical examination at a health care facility

## 2021-01-09 DIAGNOSIS — E03.9 HYPOTHYROIDISM, UNSPECIFIED TYPE: Primary | ICD-10-CM

## 2021-01-09 RX ORDER — LEVOTHYROXINE SODIUM 0.03 MG/1
25 TABLET ORAL DAILY
Qty: 60 TABLET | Refills: 1 | Status: SHIPPED | OUTPATIENT
Start: 2021-01-09

## 2021-04-06 ENCOUNTER — OFFICE VISIT (OUTPATIENT)
Dept: PRIMARY CARE CLINIC | Age: 71
End: 2021-04-06
Payer: MEDICARE

## 2021-04-06 VITALS — SYSTOLIC BLOOD PRESSURE: 102 MMHG | DIASTOLIC BLOOD PRESSURE: 64 MMHG | RESPIRATION RATE: 16 BRPM | HEART RATE: 64 BPM

## 2021-04-06 DIAGNOSIS — E03.8 HYPOTHYROIDISM DUE TO HASHIMOTO'S THYROIDITIS: Primary | ICD-10-CM

## 2021-04-06 DIAGNOSIS — E06.3 HYPOTHYROIDISM DUE TO HASHIMOTO'S THYROIDITIS: Primary | ICD-10-CM

## 2021-04-06 DIAGNOSIS — I10 ESSENTIAL HYPERTENSION: ICD-10-CM

## 2021-04-06 PROCEDURE — 99213 OFFICE O/P EST LOW 20 MIN: CPT | Performed by: INTERNAL MEDICINE

## 2021-04-06 PROCEDURE — 1090F PRES/ABSN URINE INCON ASSESS: CPT | Performed by: INTERNAL MEDICINE

## 2021-04-06 PROCEDURE — G8417 CALC BMI ABV UP PARAM F/U: HCPCS | Performed by: INTERNAL MEDICINE

## 2021-04-06 PROCEDURE — 1123F ACP DISCUSS/DSCN MKR DOCD: CPT | Performed by: INTERNAL MEDICINE

## 2021-04-06 PROCEDURE — 1036F TOBACCO NON-USER: CPT | Performed by: INTERNAL MEDICINE

## 2021-04-06 PROCEDURE — G8427 DOCREV CUR MEDS BY ELIG CLIN: HCPCS | Performed by: INTERNAL MEDICINE

## 2021-04-06 PROCEDURE — 3017F COLORECTAL CA SCREEN DOC REV: CPT | Performed by: INTERNAL MEDICINE

## 2021-04-06 PROCEDURE — G8399 PT W/DXA RESULTS DOCUMENT: HCPCS | Performed by: INTERNAL MEDICINE

## 2021-04-06 PROCEDURE — 4040F PNEUMOC VAC/ADMIN/RCVD: CPT | Performed by: INTERNAL MEDICINE

## 2021-04-06 ASSESSMENT — ENCOUNTER SYMPTOMS
ABDOMINAL DISTENTION: 0
NAUSEA: 0
SHORTNESS OF BREATH: 0
VOMITING: 0
ABDOMINAL PAIN: 0
SINUS PRESSURE: 0
BACK PAIN: 0
WHEEZING: 0
SINUS PAIN: 0
COUGH: 0
CONSTIPATION: 0
DIARRHEA: 0

## 2021-04-06 NOTE — PATIENT INSTRUCTIONS
Schedule a Vaccine  When you qualify to receive the vaccine, call the [facility]) COVID-19 Vaccination Hotline to schedule your appointment or to get additional information about the [facility]) locations which are offering the COVID-19 vaccine. To be 94% effective, it's important that you receive two doses of one of the COVID-19 vaccines. -If you are receiving the Pacheco Peter vaccine, your second shot will be scheduled as close to 21 days after the first shot as possible. -If you are receiving the Moderna vaccine, your second shot will be scheduled as close to 28 days after the first shot as possible. [facility]) COVID-19 Vaccination Hotline: [phone]    Links to [facility]) website and [facility] website:    [place] [facility]-monitoring-coronavirus-covid-19/covid-19-vaccine/[place]-vaccine    [email or web address]

## 2021-04-07 NOTE — PROGRESS NOTES
[address] PRIMARY CARE  [address] [facility]  [address] [address]  Dept: [phone]  Dept Fax: [phone]    Aurea De Leon is a 65 y.o. female who presents today for her medical conditions/complaints as noted below. Chief Complaint   Patient presents with    Follow-up       HPI:     This is a 77-year-old female who is here for regular follow-up for hypothyroidism and essential hypertension. She is from [facility] and accompanied by caretakers. Reviewed all her meds and updated the list currently there are no complaints        Hemoglobin A1C (%)   Date Value   2021 5.2   2018 5.2   2017 5.6             ( goal A1C is < 7)   No results found for: LABMICR  LDL Cholesterol (mg/dL)   Date Value   2021 64   2017 90   2016 103     LDL Calculated (mg/dL)   Date Value   2019 76   2018 81   10/29/2015 95       (goal LDL is <100)   AST (U/L)   Date Value   2021 20     ALT (U/L)   Date Value   2021 5     BUN (mg/dL)   Date Value   2021 10     BP Readings from Last 3 Encounters:   21 102/64   21 96/62   20 105/62          (goal 120/80)    Past Medical History:   Diagnosis Date    Allergic rhinitis     Bipolar affective (HCC)     Constipation     Contracture of muscle of multiple sites     bilateral upper extremities.     Dermatomyositis ([address]     Dysphagia     Hypercholesterolemia     Hypertension     Mental retardation, profound (I.Q. < 20)     Neuroleptic-induced tardive dyskinesia     PTSD (post-traumatic stress disorder)     Pyloric ulcer     w stenosis    Schizophrenia ([address]       Past Surgical History:   Procedure Laterality Date    ABDOMEN SURGERY      pyloric ulcer surgery    COLONOSCOPY  2017    attempted-poor prep    COLONOSCOPY  2018    suboptimal prep; redundant colon; small internal hemorrhoids    IA COLON CA SCRN NOT HI RSK IND N/A 2017 COLONOSCOPY, ATTEMPTED / POOR -PREP performed by Beto Lombardo MD at [address] [place] [address] NOT  [address] [place] [address] N/A 2/8/2018    COLONOSCOPY performed by [facility]o Lombardo MD at [facility] reviewed. No pertinent family history. Social History     Tobacco Use    Smoking status: Never Smoker    Smokeless tobacco: Never Used   Substance Use Topics    Alcohol use: No     Alcohol/week: 0.0 standard drinks      Current Outpatient Medications   Medication Sig Dispense Refill    levothyroxine (SYNTHROID) 25 MCG tablet Take 1 tablet by mouth daily 60 tablet 1    divalproex (DEPAKOTE SPRINKLES) 125 MG capsule Take 125 mg by mouth 2 times daily Give 5 capsules by mouth twice daily      triamcinolone (KENALOG) 0.1 % ointment Apply topically 2 times daily Apply topically 2 times daily.       Propylene Glycol (SYSTANE COMPLETE OP) Apply to eye      Cholecalciferol (VITAMIN D) 2000 units TABS tablet Take 1 tablet by mouth daily 90 tablet 3    Nutritional Supplements (BOOST) LIQD 2 bottles/cans a day 60 Can 11    atorvastatin (LIPITOR) 40 MG tablet Take 1 tablet by mouth daily 90 tablet 3    amLODIPine (NORVASC) 5 MG tablet Take 1 tablet by mouth daily 30 tablet 3    valproate (DEPAKENE) 250 MG/5ML solution Take 25 mLs by mouth nightly 1 Bottle 0    traZODone (DESYREL) 100 MG tablet Take 100 mg by mouth nightly      ibuprofen (ADVIL;MOTRIN) 200 MG tablet Take 200 mg by mouth every 6 hours as needed for Pain      magnesium hydroxide (MILK OF MAGNESIA) 400 MG/5ML suspension Take 30 mLs by mouth daily as needed for Constipation      Saccharomyces boulardii (PROBIOTIC) 250 MG CAPS Take by mouth      famotidine (PEPCID) 20 MG tablet Take 20 mg by mouth 2 times daily      ipratropium (ATROVENT) 0.03 % nasal spray 2 sprays by Nasal route 2 times daily      Multiple Vitamins-Minerals (MULTIVITAMIN PO) Take by mouth daily      polyethylene glycol (GLYCOLAX) packet Take 17 g by mouth daily as needed      propranolol (INDERAL) 60 MG tablet Take 120 mg by mouth 2 times daily BID      QUEtiapine (SEROQUEL) 300 MG tablet Take 300 mg by mouth every morning       QUEtiapine (SEROQUEL) 400 MG tablet Take 400 mg by mouth daily      senna (SENOKOT) 8.6 MG TABS tablet Take 1 tablet by mouth 2 times daily      trihexyphenidyl (ARTANE) 2 MG tablet Take 2 mg by mouth nightly      acetaminophen (TYLENOL) 160 MG/5ML liquid Take 15 mg/kg by mouth every 4 hours as needed for Fever 20 ML Q 4 HRS      acetaminophen (TYLENOL) 325 MG tablet Take 650 mg by mouth every 4 hours as needed for Pain 2 Q 4 HRS      bisacodyl (DULCOLAX) 5 MG EC tablet Take 5 mg by mouth as needed for Constipation 1 EVERY 2 DAYS AS NEEDED      hydrocortisone (ANUSOL-HC) 2.5 % rectal cream Place rectally 2 times daily Place rectally 2 times daily.  Dextromethorphan-guaiFENesin  MG/5ML SYRP Take 5 mLs by mouth every 4 hours as needed for Cough      diphenhydrAMINE (BENADRYL) 25 MG capsule Take 25 mg by mouth every 6 hours as needed for Itching or Allergies      guaiFENesin (MUCINEX) 600 MG extended release tablet Take 600 mg by mouth 2 times daily as needed for Congestion       No current facility-administered medications for this visit.       Allergies   Allergen Reactions    Augmentin [Amoxicillin-Pot Clavulanate]     Cogentin [Benztropine]     Methotrexate Derivatives        Health Maintenance   Topic Date Due    Hepatitis C screen  Never done    Shingles Vaccine (2 of 3) 03/16/2015    Colon cancer screen colonoscopy  02/08/2020    Breast cancer screen  07/24/2021    Lipid screen  01/05/2022    Potassium monitoring  01/05/2022    Creatinine monitoring  01/05/2022    Annual Wellness Visit (AWV)  01/06/2022    DTaP/Tdap/Td vaccine (2 - Tdap) 04/17/2025    DEXA (modify frequency per FRAX score)  Completed    Flu vaccine  Completed    Pneumococcal 65+ years Vaccine  Completed    COVID-19 Vaccine  Completed    Hepatitis A vaccine Aged Out    Hepatitis B vaccine  Aged Out    Hib vaccine  Aged Out    Meningococcal (ACWY) vaccine  Aged Out       Subjective:      Review of Systems   Constitutional: Negative for activity change, appetite change, chills, fatigue and fever. HENT: Negative for congestion, ear pain, hearing loss, nosebleeds, sinus pressure, sinus pain and sneezing. Eyes: Negative for visual disturbance. Respiratory: Negative for cough, shortness of breath and wheezing. Cardiovascular: Negative for chest pain and palpitations. Gastrointestinal: Negative for abdominal distention, abdominal pain, constipation, diarrhea, nausea and vomiting. Endocrine: Negative for cold intolerance, heat intolerance, polydipsia, polyphagia and polyuria. Genitourinary: Negative for difficulty urinating, menstrual problem, vaginal bleeding and vaginal discharge. Musculoskeletal: Negative for back pain and joint swelling. Skin: Negative for rash. Neurological: Negative for numbness and headaches. Psychiatric/Behavioral: Positive for confusion and decreased concentration. Negative for behavioral problems and sleep disturbance. The patient is not nervous/anxious. All other systems reviewed and are negative. Objective:     Physical Exam  Vitals signs and nursing note reviewed. Constitutional:       General: She is not in acute distress. Appearance: She is well-developed and normal weight. She is not diaphoretic. HENT:      Head: Normocephalic and atraumatic. Right Ear: Hearing normal.      Left Ear: Hearing normal.      Mouth/Throat:      Pharynx: Uvula midline. Eyes:      General: No scleral icterus. Conjunctiva/sclera: Conjunctivae normal.      Pupils: Pupils are equal, round, and reactive to light. Neck:      Musculoskeletal: Full passive range of motion without pain, normal range of motion and neck supple. Thyroid: No thyroid mass or thyromegaly. Vascular: No JVD.       Trachea: Phonation normal.   Cardiovascular:      Rate and Rhythm: Normal rate and regular rhythm. Pulses: No decreased pulses. Carotid pulses are 2+ on the right side and 2+ on the left side. Radial pulses are 2+ on the right side and 2+ on the left side. Heart sounds: Normal heart sounds. No murmur. Pulmonary:      Effort: Pulmonary effort is normal. No accessory muscle usage or respiratory distress. Breath sounds: Normal breath sounds. No wheezing or rales. Abdominal:      General: Bowel sounds are normal. There is no distension. Palpations: Abdomen is soft. Tenderness: There is no abdominal tenderness. Musculoskeletal: Normal range of motion. General: No deformity. Lymphadenopathy:      Cervical: No cervical adenopathy. Skin:     General: Skin is warm. Capillary Refill: Capillary refill takes less than 2 seconds. Findings: No rash. Neurological:      Mental Status: She is alert. She is disoriented. Gait: Gait abnormal.      Deep Tendon Reflexes: Reflexes normal.      Comments: Wheelchair-bound   Psychiatric:         Mood and Affect: Affect is blunt, flat and inappropriate. Speech: She is noncommunicative. Behavior: Behavior is slowed and withdrawn. /64   Pulse 64 Comment: 64  Resp 16     Assessment:          1. Hypothyroidism due to Hashimoto's thyroiditis  Synthroid     2. Essential hypertension  Well controlled            Diagnosis Orders   1. Hypothyroidism due to Hashimoto's thyroiditis     2. Essential hypertension             Plan:      Return in about 6 months (around 10/6/2021). No orders of the defined types were placed in this encounter. No orders of the defined types were placed in this encounter. Patient given educational materials - see patient instructions. Discussed use, benefit, and side effects of prescribedmedications. All patient questions answered. Pt voiced understanding.  Reviewed health maintenance. Instructed to continue current medications, diet and exercise. Patient agreed with treatment plan. Follow up as directed. I spent a total of 15 minutes face to face with this patient. Over 50% of that time was spent on counseling and care coordination. Please see assessment and plan for details. Electronically signed by Alexa Trinidad MD on 4/6/2021 at 11:45 PM      Please note that this chart was generated using voice recognition Dragon dictation software. Although every effort was made to ensure the accuracy of this automatedtranscription, some errors in transcription may have occurred.

## 2021-10-13 ENCOUNTER — TELEPHONE (OUTPATIENT)
Dept: PRIMARY CARE CLINIC | Age: 71
End: 2021-10-13

## 2021-10-13 NOTE — TELEPHONE ENCOUNTER
Contacted [facility], they stated patients nurse was not in today, stated that we could call back tomorrow to attempt to r/s missed appointment.

## (undated) DEVICE — NO USE 18 MONTHS GOWN ISOL XL YEL LF

## (undated) DEVICE — CUP MED 1OZ CLR POLYPR FEED GRAD W/O LID

## (undated) DEVICE — TUBING, SUCTION, 1/4" X 12', STRAIGHT: Brand: MEDLINE

## (undated) DEVICE — GAUZE,SPONGE,4"X4",16PLY,STRL,LF,10/TRAY: Brand: MEDLINE

## (undated) DEVICE — BASIN EMSIS 700ML GRAPHITE PLAS KID SHP GRAD

## (undated) DEVICE — 60 ML SYRINGE REGULAR TIP: Brand: MONOJECT